# Patient Record
Sex: FEMALE | Race: WHITE | Employment: PART TIME | URBAN - METROPOLITAN AREA
[De-identification: names, ages, dates, MRNs, and addresses within clinical notes are randomized per-mention and may not be internally consistent; named-entity substitution may affect disease eponyms.]

---

## 2020-02-07 ENCOUNTER — OFFICE VISIT (OUTPATIENT)
Dept: FAMILY MEDICINE CLINIC | Age: 25
End: 2020-02-07
Payer: COMMERCIAL

## 2020-02-07 VITALS
HEIGHT: 68 IN | TEMPERATURE: 97.7 F | DIASTOLIC BLOOD PRESSURE: 84 MMHG | RESPIRATION RATE: 16 BRPM | SYSTOLIC BLOOD PRESSURE: 122 MMHG | WEIGHT: 226 LBS | OXYGEN SATURATION: 99 % | BODY MASS INDEX: 34.25 KG/M2 | HEART RATE: 97 BPM

## 2020-02-07 PROCEDURE — G8417 CALC BMI ABV UP PARAM F/U: HCPCS | Performed by: NURSE PRACTITIONER

## 2020-02-07 PROCEDURE — 4004F PT TOBACCO SCREEN RCVD TLK: CPT | Performed by: NURSE PRACTITIONER

## 2020-02-07 PROCEDURE — G8484 FLU IMMUNIZE NO ADMIN: HCPCS | Performed by: NURSE PRACTITIONER

## 2020-02-07 PROCEDURE — 99204 OFFICE O/P NEW MOD 45 MIN: CPT | Performed by: NURSE PRACTITIONER

## 2020-02-07 PROCEDURE — G8427 DOCREV CUR MEDS BY ELIG CLIN: HCPCS | Performed by: NURSE PRACTITIONER

## 2020-02-07 RX ORDER — TIZANIDINE 4 MG/1
4 TABLET ORAL EVERY 8 HOURS PRN
Qty: 30 TABLET | Refills: 0 | Status: SHIPPED | OUTPATIENT
Start: 2020-02-07 | End: 2020-03-06 | Stop reason: SDUPTHER

## 2020-02-07 RX ORDER — BUTALBITAL, ACETAMINOPHEN AND CAFFEINE 50; 325; 40 MG/1; MG/1; MG/1
1 TABLET ORAL EVERY 6 HOURS PRN
Qty: 60 TABLET | Refills: 0 | Status: SHIPPED | OUTPATIENT
Start: 2020-02-07 | End: 2020-07-29

## 2020-02-07 RX ORDER — TOPIRAMATE 50 MG/1
50 TABLET, FILM COATED ORAL NIGHTLY
Qty: 30 TABLET | Refills: 3 | Status: SHIPPED | OUTPATIENT
Start: 2020-02-07 | End: 2020-03-06

## 2020-02-07 RX ORDER — ALBUTEROL SULFATE 90 UG/1
2 AEROSOL, METERED RESPIRATORY (INHALATION) 4 TIMES DAILY PRN
Qty: 1 INHALER | Refills: 5 | Status: SHIPPED | OUTPATIENT
Start: 2020-02-07 | End: 2020-03-06 | Stop reason: SDUPTHER

## 2020-02-07 SDOH — HEALTH STABILITY: MENTAL HEALTH: HOW OFTEN DO YOU HAVE A DRINK CONTAINING ALCOHOL?: NEVER

## 2020-02-07 ASSESSMENT — ENCOUNTER SYMPTOMS
ABDOMINAL DISTENTION: 0
NAUSEA: 1
ABDOMINAL PAIN: 0
EYE PAIN: 0
VOMITING: 0
SHORTNESS OF BREATH: 0
CONSTIPATION: 0
COLOR CHANGE: 0
EYE DISCHARGE: 0
EYE REDNESS: 0
DIARRHEA: 0
PHOTOPHOBIA: 1
BLOOD IN STOOL: 0
EYE ITCHING: 0
WHEEZING: 0
CHEST TIGHTNESS: 0
COUGH: 0
BACK PAIN: 1
ANAL BLEEDING: 0

## 2020-02-07 ASSESSMENT — PATIENT HEALTH QUESTIONNAIRE - PHQ9
2. FEELING DOWN, DEPRESSED OR HOPELESS: 0
SUM OF ALL RESPONSES TO PHQ QUESTIONS 1-9: 0
1. LITTLE INTEREST OR PLEASURE IN DOING THINGS: 0
SUM OF ALL RESPONSES TO PHQ QUESTIONS 1-9: 0
SUM OF ALL RESPONSES TO PHQ9 QUESTIONS 1 & 2: 0

## 2020-02-07 NOTE — PROGRESS NOTES
Subjective:     Patient ID: Katie Olson is a 22 y.o. female who presentstoday for:  Chief Complaint   Patient presents with   Minal Carranza Doctor     Pt. is here to establish care today. Pt. has seen Dr. Mary Hopson in the past. Pt. has a h/o migraines and is taking medication for them but its no longer working.  Anxiety     Pt. is here for increased anxiety. Pt. states she was on medication in the past but can't remember the name.  Shoulder Pain     Pt. states she clentches her shoulders a lot and now has pain inbetween her shoulders.  Health Maintenance     Pt. declines the flu vaccine. Migraine    This is a chronic problem. The current episode started more than 1 year ago. The problem has been gradually worsening. Associated symptoms include back pain, nausea, phonophobia and photophobia. Pertinent negatives include no abdominal pain, coughing, dizziness, ear pain, eye pain, eye redness, fever, neck pain, rhinorrhea, sore throat, vomiting, weakness or weight loss. The symptoms are aggravated by bright light and noise. She has tried NSAIDs, Excedrin and acetaminophen for the symptoms. Her past medical history is significant for migraine headaches. Asthma   There is no chest tightness, cough, difficulty breathing, frequent throat clearing, hemoptysis, hoarse voice, shortness of breath, sputum production or wheezing. This is a chronic problem. The problem occurs intermittently. Associated symptoms include headaches. Pertinent negatives include no appetite change, chest pain, dyspnea on exertion, ear congestion, ear pain, fever, heartburn, malaise/fatigue, myalgias, nasal congestion, orthopnea, PND, postnasal drip, rhinorrhea, sneezing, sore throat, sweats, trouble swallowing or weight loss. Her symptoms are alleviated by beta-agonist and steroid inhaler. She reports moderate improvement on treatment. Risk factors for lung disease include smoking/tobacco exposure.  Her past medical history Normocephalic and atraumatic. Right Ear: Tympanic membrane, ear canal and external ear normal.      Left Ear: Tympanic membrane, ear canal and external ear normal.      Nose: Nose normal. No congestion or rhinorrhea. Mouth/Throat:      Mouth: Mucous membranes are moist.      Pharynx: Oropharynx is clear. No oropharyngeal exudate or posterior oropharyngeal erythema. Eyes:      Extraocular Movements: Extraocular movements intact. Conjunctiva/sclera: Conjunctivae normal.      Pupils: Pupils are equal, round, and reactive to light. Neck:      Musculoskeletal: Normal range of motion and neck supple. No muscular tenderness. Cardiovascular:      Rate and Rhythm: Normal rate and regular rhythm. Heart sounds: Normal heart sounds. Pulmonary:      Effort: Pulmonary effort is normal. No respiratory distress. Breath sounds: Normal breath sounds. No wheezing. Abdominal:      General: Bowel sounds are normal. There is no distension. Palpations: Abdomen is soft. Tenderness: There is no abdominal tenderness. Musculoskeletal: Normal range of motion. General: No swelling. Thoracic back: She exhibits tenderness and spasm. Lymphadenopathy:      Cervical: No cervical adenopathy. Skin:     General: Skin is warm and dry. Neurological:      General: No focal deficit present. Mental Status: She is alert and oriented to person, place, and time. Cranial Nerves: No cranial nerve deficit. Sensory: No sensory deficit. Coordination: Coordination normal.   Psychiatric:         Mood and Affect: Mood normal.         Behavior: Behavior normal.         Assessment & Plan:       Diagnosis Orders   1. Migraine without aura and without status migrainosus, not intractable  topiramate (TOPAMAX) 50 MG tablet    butalbital-acetaminophen-caffeine (FIORICET, ESGIC) -40 MG per tablet   2.  Mild persistent asthma without complication  albuterol sulfate  (90 Base) MCG/ACT inhaler    mometasone (ASMANEX, 30 METERED DOSES,) 220 MCG/INH inhaler   3. Back muscle spasm  tiZANidine (ZANAFLEX) 4 MG tablet   4. Eye exam, routine  [facility] - Gregg Ko, OD, Optometry, [place]     Orders Placed This Encounter   Procedures    [facility] - [address], Stuart Hoffman, Optometry, [place]     Referral Priority:   Routine     Referral Type:   Eval and Treat     Referral Reason:   Specialty Services Required     Referred to Provider:   Abel Perez     Requested Specialty:   Optometry     Number of Visits Requested:   1     Orders Placed This Encounter   Medications    albuterol sulfate  (90 Base) MCG/ACT inhaler     Sig: Inhale 2 puffs into the lungs 4 times daily as needed for Wheezing     Dispense:  1 Inhaler     Refill:  5    mometasone (ASMANEX, 30 METERED DOSES,) 220 MCG/INH inhaler     Sig: Inhale 1 puff into the lungs daily     Dispense:  1 Inhaler     Refill:  5    tiZANidine (ZANAFLEX) 4 MG tablet     Sig: Take 1 tablet by mouth every 8 hours as needed (spasm)     Dispense:  30 tablet     Refill:  0    topiramate (TOPAMAX) 50 MG tablet     Sig: Take 1 tablet by mouth nightly     Dispense:  30 tablet     Refill:  3    butalbital-acetaminophen-caffeine (FIORICET, ESGIC) -40 MG per tablet     Sig: Take 1 tablet by mouth every 6 hours as needed for Migraine     Dispense:  60 tablet     Refill:  0     Medications Discontinued During This Encounter   Medication Reason    albuterol (PROVENTIL HFA) 108 (90 BASE) MCG/ACT inhaler     mometasone (ASMANEX 30 METERED DOSES) 220 MCG/INH inhaler     norethindrone-ethinyl estradiol (NORINYL 1+35, 28,) 1-35 MG-MCG per tablet      Return in about 4 weeks (around 3/6/2020). Reviewed with the patient: currentclinical status, medications, activities and diet.      Side effects, adverse effects of the medicationprescribed today, as well as treatment plan/ rationale and result expectations havebeen discussed with the patient who expresses

## 2020-02-08 ASSESSMENT — ENCOUNTER SYMPTOMS
HOARSE VOICE: 0
TROUBLE SWALLOWING: 0
FREQUENT THROAT CLEARING: 0
CHEST TIGHTNESS: 0
HEMOPTYSIS: 0
SORE THROAT: 0
HEARTBURN: 0
RHINORRHEA: 0
SPUTUM PRODUCTION: 0
DIFFICULTY BREATHING: 0

## 2020-02-13 ENCOUNTER — TELEPHONE (OUTPATIENT)
Dept: FAMILY MEDICINE CLINIC | Age: 25
End: 2020-02-13

## 2020-02-13 NOTE — TELEPHONE ENCOUNTER
Jh Best has been trying to reach Dr LUIS [facility] office but there is never an answer.   Herman Mantilla was trying to reach them as well with no reply The referral to Dr. Jose Alanis

## 2020-02-13 NOTE — TELEPHONE ENCOUNTER
Matthieu Billingsley was able to get the appt sooner with Dr. Leatha Villasenor. Pt will just keep that referral and appt. No new referral is needed.

## 2020-02-13 NOTE — TELEPHONE ENCOUNTER
Pt called back and would like a new referral to [facility] for her vision referral.    Address:   [address] [place] [address], [address] [place] [address]    Phone number:   [phone]    Please put a new referral in for this.     TY

## 2020-02-17 ENCOUNTER — TELEPHONE (OUTPATIENT)
Dept: FAMILY MEDICINE CLINIC | Age: 25
End: 2020-02-17

## 2020-03-06 ENCOUNTER — OFFICE VISIT (OUTPATIENT)
Dept: FAMILY MEDICINE CLINIC | Age: 25
End: 2020-03-06
Payer: COMMERCIAL

## 2020-03-06 VITALS
SYSTOLIC BLOOD PRESSURE: 114 MMHG | TEMPERATURE: 98.3 F | OXYGEN SATURATION: 98 % | WEIGHT: 226 LBS | HEIGHT: 68 IN | RESPIRATION RATE: 16 BRPM | HEART RATE: 87 BPM | DIASTOLIC BLOOD PRESSURE: 80 MMHG | BODY MASS INDEX: 34.25 KG/M2

## 2020-03-06 PROCEDURE — G8417 CALC BMI ABV UP PARAM F/U: HCPCS | Performed by: NURSE PRACTITIONER

## 2020-03-06 PROCEDURE — G8427 DOCREV CUR MEDS BY ELIG CLIN: HCPCS | Performed by: NURSE PRACTITIONER

## 2020-03-06 PROCEDURE — 4004F PT TOBACCO SCREEN RCVD TLK: CPT | Performed by: NURSE PRACTITIONER

## 2020-03-06 PROCEDURE — G8484 FLU IMMUNIZE NO ADMIN: HCPCS | Performed by: NURSE PRACTITIONER

## 2020-03-06 PROCEDURE — 99214 OFFICE O/P EST MOD 30 MIN: CPT | Performed by: NURSE PRACTITIONER

## 2020-03-06 RX ORDER — ALBUTEROL SULFATE 90 UG/1
2 AEROSOL, METERED RESPIRATORY (INHALATION) 4 TIMES DAILY PRN
Qty: 1 INHALER | Refills: 5 | Status: SHIPPED | OUTPATIENT
Start: 2020-03-06 | End: 2021-02-10 | Stop reason: SDUPTHER

## 2020-03-06 RX ORDER — TOPIRAMATE 100 MG/1
100 TABLET, FILM COATED ORAL NIGHTLY
Qty: 30 TABLET | Refills: 3 | Status: SHIPPED | OUTPATIENT
Start: 2020-03-06 | End: 2020-03-27 | Stop reason: SDUPTHER

## 2020-03-06 RX ORDER — TIZANIDINE 4 MG/1
4 TABLET ORAL EVERY 8 HOURS PRN
Qty: 60 TABLET | Refills: 2 | Status: SHIPPED | OUTPATIENT
Start: 2020-03-06 | End: 2020-03-27 | Stop reason: SDUPTHER

## 2020-03-06 RX ORDER — TOPIRAMATE 50 MG/1
50 TABLET, FILM COATED ORAL NIGHTLY
Qty: 30 TABLET | Refills: 3 | Status: CANCELLED | OUTPATIENT
Start: 2020-03-06

## 2020-03-06 RX ORDER — ONDANSETRON 4 MG/1
TABLET, ORALLY DISINTEGRATING ORAL
COMMUNITY
Start: 2020-02-18

## 2020-03-06 RX ORDER — LURASIDONE HYDROCHLORIDE 20 MG/1
TABLET, FILM COATED ORAL
COMMUNITY
Start: 2020-02-29 | End: 2020-07-29

## 2020-03-06 ASSESSMENT — ENCOUNTER SYMPTOMS
DIFFICULTY BREATHING: 0
SPUTUM PRODUCTION: 0
HEMOPTYSIS: 0
RHINORRHEA: 0
EYE REDNESS: 0
ABDOMINAL DISTENTION: 0
ABDOMINAL PAIN: 0
EYE PAIN: 0
NAUSEA: 1
COUGH: 0
HEARTBURN: 0
CONSTIPATION: 0
BLOOD IN STOOL: 0
SORE THROAT: 0
VOMITING: 0
PHOTOPHOBIA: 1
HOARSE VOICE: 0
BACK PAIN: 1
WHEEZING: 0
DIARRHEA: 1
FREQUENT THROAT CLEARING: 0
CHEST TIGHTNESS: 0
SHORTNESS OF BREATH: 0
TROUBLE SWALLOWING: 0
FLATUS: 0
BLOATING: 0

## 2020-03-06 NOTE — PROGRESS NOTES
Subjective:     Patient ID: Ivy Other is a 26 y.o. female who presentstoday for:  Chief Complaint   Patient presents with    Migraine     Pt. is here for a 4 week f/u on migraines. Pt. was prescribed Topamax and Fioricet at her last visit with mild relief. Pt. states the medication she takes at night helps but the one she is suppose to take when a migraine comes on isn't working.  Spasms     Pt. is here for a f/u on muscle spasms. Pt. states the zanaflex is working well.  Diarrhea     Pt. is here with c/o diarrhea since she was in the hospital last month. Migraine    This is a chronic problem. The current episode started more than 1 year ago. The problem has been gradually worsening. The pain is located in the bilateral region. The pain does not radiate. The pain quality is similar to prior headaches. The quality of the pain is described as aching. The pain is moderate. Associated symptoms include back pain, nausea, phonophobia and photophobia. Pertinent negatives include no abdominal pain, coughing, dizziness, ear pain, eye pain, eye redness, fever, neck pain, rhinorrhea, sore throat, vomiting, weakness or weight loss. The symptoms are aggravated by bright light and noise. She has tried NSAIDs, Excedrin and acetaminophen (topamax, fioricet) for the symptoms. The treatment provided moderate relief. Her past medical history is significant for migraine headaches. Diarrhea    This is a new problem. The current episode started 1 to 4 weeks ago. The problem occurs 5 to 10 times per day. The problem has been unchanged. The stool consistency is described as mucous. Associated symptoms include headaches. Pertinent negatives include no abdominal pain, arthralgias, bloating, chills, coughing, fever, increased  flatus, myalgias, sweats, URI, vomiting or weight loss. Nothing aggravates the symptoms. Risk factors include recent antibiotic use. She has tried nothing for the symptoms.        Past Medical

## 2020-03-27 ENCOUNTER — TELEMEDICINE (OUTPATIENT)
Dept: FAMILY MEDICINE CLINIC | Age: 25
End: 2020-03-27
Payer: COMMERCIAL

## 2020-03-27 PROCEDURE — G8427 DOCREV CUR MEDS BY ELIG CLIN: HCPCS | Performed by: NURSE PRACTITIONER

## 2020-03-27 PROCEDURE — 99213 OFFICE O/P EST LOW 20 MIN: CPT | Performed by: NURSE PRACTITIONER

## 2020-03-27 RX ORDER — TOPIRAMATE 100 MG/1
100 TABLET, FILM COATED ORAL 2 TIMES DAILY
Qty: 60 TABLET | Refills: 3 | Status: SHIPPED | OUTPATIENT
Start: 2020-03-27 | End: 2020-08-18

## 2020-03-27 RX ORDER — TIZANIDINE 4 MG/1
4 TABLET ORAL EVERY 8 HOURS PRN
Qty: 60 TABLET | Refills: 2 | Status: SHIPPED | OUTPATIENT
Start: 2020-03-27 | End: 2020-07-08

## 2020-03-27 RX ORDER — SUMATRIPTAN 50 MG/1
50 TABLET, FILM COATED ORAL
Qty: 9 TABLET | Refills: 2 | Status: SHIPPED | OUTPATIENT
Start: 2020-03-27 | End: 2020-07-29

## 2020-03-27 ASSESSMENT — ENCOUNTER SYMPTOMS
ABDOMINAL PAIN: 0
VOMITING: 0
ABDOMINAL DISTENTION: 0
CONSTIPATION: 0
COUGH: 0
BACK PAIN: 1
BLOOD IN STOOL: 0
WHEEZING: 0
NAUSEA: 0
RHINORRHEA: 0
PHOTOPHOBIA: 0
TROUBLE SWALLOWING: 0
SORE THROAT: 0
EYE REDNESS: 0
DIARRHEA: 0
EYE PAIN: 0
SHORTNESS OF BREATH: 0

## 2020-03-27 NOTE — PROGRESS NOTES
Subjective:     Patient ID: Love Boyd is a 26 y.o. female who presentstoday for:  Chief Complaint   Patient presents with    Migraine     3/27/2020    TELEHEALTH EVALUATION -- Audio/Visual (During ZZKBE-13 public health emergency)    Due to [place] 19 outbreak, patient's office visit was converted to a virtual visit. Patient was contacted and agreed to proceed with a virtual visit via Doxy. me  The risks and benefits of converting to a virtual visit were discussed in light of the current infectious disease epidemic. Patient also understood that insurance coverage and co-pays are up to their individual insurance plans. HPI  F/u on migraines reports little improvement since last visit but patient also got new glasses and feels that could be contributing to her symptoms. Pain is similar to previous headaches and mild to moderate with the headaches occur. Fioricet helps take the edge off but does not get ride of them completely. Denies any other concerns today. Past Medical History:   Diagnosis Date    Anxiety     Asthma     Bipolar depression ([facility] [address]     Migraines      Current Outpatient Medications on File Prior to Visit   Medication Sig Dispense Refill    LATUDA 20 MG TABS tablet TAKE 1 TABLET BY MOUTH ONCE DAILY WITH MEALS. (take with at least 350 calories of food)      ondansetron (ZOFRAN-ODT) 4 MG disintegrating tablet DISSOLVE 1 (ONE) TABLET BY MOUTH EVERY 8 HOURS      albuterol sulfate  (90 Base) MCG/ACT inhaler Inhale 2 puffs into the lungs 4 times daily as needed for Wheezing 1 Inhaler 5    mometasone (ASMANEX, 30 METERED DOSES,) 220 MCG/INH inhaler Inhale 1 puff into the lungs daily 1 Inhaler 5    butalbital-acetaminophen-caffeine (FIORICET, ESGIC) -40 MG per tablet Take 1 tablet by mouth every 6 hours as needed for Migraine 60 tablet 0     No current facility-administered medications on file prior to visit. No past surgical history on file.      Family History topiramate (TOPAMAX) 100 MG tablet REORDER    tiZANidine (ZANAFLEX) 4 MG tablet REORDER     Return in about 4 weeks (around 4/24/2020). Reviewed with the patient: currentclinical status, medications, activities and diet. Side effects, adverse effects of the medicationprescribed today, as well as treatment plan/ rationale and result expectations havebeen discussed with the patient who expresses understanding and desires to proceed. Pt instructions reviewed and given to patient.     Close follow up to evaluate treatment resultsand for coordination of care. I have reviewed the patient's medical historyin detail and updated the computerized patient record.     Oliva Bee, APRN - CNP

## 2020-04-03 ENCOUNTER — TELEMEDICINE (OUTPATIENT)
Dept: FAMILY MEDICINE CLINIC | Age: 25
End: 2020-04-03
Payer: COMMERCIAL

## 2020-04-03 PROCEDURE — G8428 CUR MEDS NOT DOCUMENT: HCPCS | Performed by: NURSE PRACTITIONER

## 2020-04-03 PROCEDURE — 99213 OFFICE O/P EST LOW 20 MIN: CPT | Performed by: NURSE PRACTITIONER

## 2020-04-03 RX ORDER — FLUTICASONE PROPIONATE 50 MCG
1 SPRAY, SUSPENSION (ML) NASAL DAILY
Qty: 1 BOTTLE | Refills: 2 | Status: SHIPPED | OUTPATIENT
Start: 2020-04-03 | End: 2020-04-28

## 2020-04-03 RX ORDER — CETIRIZINE HYDROCHLORIDE 10 MG/1
10 TABLET ORAL DAILY
Qty: 90 TABLET | Refills: 2 | Status: SHIPPED | OUTPATIENT
Start: 2020-04-03

## 2020-04-03 ASSESSMENT — ENCOUNTER SYMPTOMS
RHINORRHEA: 0
NAUSEA: 0
VOMITING: 0
PHOTOPHOBIA: 0
WHEEZING: 0
SORE THROAT: 0
EYE PAIN: 0
COUGH: 0
TROUBLE SWALLOWING: 0
BLOOD IN STOOL: 0
CONSTIPATION: 0
DIARRHEA: 0
EYE REDNESS: 0
ABDOMINAL DISTENTION: 0
SHORTNESS OF BREATH: 0
ABDOMINAL PAIN: 0

## 2020-04-03 NOTE — PROGRESS NOTES
Subjective:     Patient ID: Moses Zhang is a 26 y.o. female who presentstoday for:  Chief Complaint   Patient presents with    Migraine     4/3/2020    TELEHEALTH EVALUATION -- Audio/Visual (During XIINC-19 public health emergency)    Due to COVID 19 outbreak, patient's office visit was converted to a virtual visit. Patient was contacted and agreed to proceed with a virtual visit via [employer]ari[employer]. me  The risks and benefits of converting to a virtual visit were discussed in light of the current infectious disease epidemic. Patient also understood that insurance coverage and co-pays are up to their individual insurance plans. Migraine    This is a recurrent problem. The current episode started more than 1 month ago. The problem occurs daily. The problem has been waxing and waning. The pain quality is similar to prior headaches. The quality of the pain is described as aching. The pain is moderate. Pertinent negatives include no abdominal pain, coughing, dizziness, ear pain, eye pain, eye redness, fever, nausea, photophobia, rhinorrhea, sore throat, vomiting or weakness. The symptoms are aggravated by bright light. Treatments tried: topamax, fioricet, imitrex, nsaids, tylenol. The treatment provided mild relief.        Past Medical History:   Diagnosis Date    Anxiety     Asthma     Bipolar depression ([facility] [address]     Migraines      Current Outpatient Medications on File Prior to Visit   Medication Sig Dispense Refill    SUMAtriptan (IMITREX) 50 MG tablet Take 1 tablet by mouth once as needed for Migraine 9 tablet 2    topiramate (TOPAMAX) 100 MG tablet Take 1 tablet by mouth 2 times daily 60 tablet 3    tiZANidine (ZANAFLEX) 4 MG tablet Take 1 tablet by mouth every 8 hours as needed (spasm) 60 tablet 2    LATUDA 20 MG TABS tablet TAKE 1 TABLET BY MOUTH ONCE DAILY WITH MEALS. (take with at least 350 calories of food)      ondansetron (ZOFRAN-ODT) 4 MG disintegrating tablet DISSOLVE 1 (ONE) TABLET BY MOUTH EVERY 8

## 2020-04-09 ENCOUNTER — OFFICE VISIT (OUTPATIENT)
Dept: NEUROLOGY | Age: 25
End: 2020-04-09
Payer: COMMERCIAL

## 2020-04-09 ENCOUNTER — TELEPHONE (OUTPATIENT)
Dept: NEUROLOGY | Age: 25
End: 2020-04-09

## 2020-04-09 VITALS — WEIGHT: 231.4 LBS | BODY MASS INDEX: 35.18 KG/M2 | DIASTOLIC BLOOD PRESSURE: 79 MMHG | SYSTOLIC BLOOD PRESSURE: 136 MMHG

## 2020-04-09 PROBLEM — G43.719 INTRACTABLE CHRONIC MIGRAINE WITHOUT AURA AND WITHOUT STATUS MIGRAINOSUS: Status: ACTIVE | Noted: 2020-04-09

## 2020-04-09 PROCEDURE — 4004F PT TOBACCO SCREEN RCVD TLK: CPT | Performed by: PSYCHIATRY & NEUROLOGY

## 2020-04-09 PROCEDURE — 99205 OFFICE O/P NEW HI 60 MIN: CPT | Performed by: PSYCHIATRY & NEUROLOGY

## 2020-04-09 PROCEDURE — G8427 DOCREV CUR MEDS BY ELIG CLIN: HCPCS | Performed by: PSYCHIATRY & NEUROLOGY

## 2020-04-09 PROCEDURE — G8417 CALC BMI ABV UP PARAM F/U: HCPCS | Performed by: PSYCHIATRY & NEUROLOGY

## 2020-04-09 RX ORDER — ELETRIPTAN HYDROBROMIDE 40 MG/1
40 TABLET, FILM COATED ORAL
Qty: 6 TABLET | Refills: 3 | Status: SHIPPED | OUTPATIENT
Start: 2020-04-09 | End: 2020-07-29

## 2020-04-09 RX ORDER — ERENUMAB-AOOE 140 MG/ML
1 INJECTION, SOLUTION SUBCUTANEOUS
Qty: 1 ML | Refills: 6 | Status: SHIPPED | OUTPATIENT
Start: 2020-04-09 | End: 2020-05-09

## 2020-04-09 ASSESSMENT — ENCOUNTER SYMPTOMS
PHOTOPHOBIA: 0
VOMITING: 0
BACK PAIN: 0
TROUBLE SWALLOWING: 0
SHORTNESS OF BREATH: 0
NAUSEA: 0
CHOKING: 0

## 2020-04-09 NOTE — PROGRESS NOTES
Subjective:      Patient ID: Ary Sloan is a 26 y.o. female who presents today for:  Chief Complaint   Patient presents with    New Patient     patient states that she is having migraines on and off all day long. She states that when she takes her medication that it works for about 2 hours and then they come back even stronger them before. She gets nausea, vomiting, light sensitive,noise sensitive, and also sees aura. States that there are times she just has to lay down and try to go to sleep. The three medications that she has been tried on seem to take the edge off but do not take them away. HPI 71-year-old right-handed female who is here for evaluation of migraine headaches. Patient is migraine headaches since she was young around 9years of age. She use  to have about 2 migraine headache days a week then they became 3-4  migraine headache  days a week and now almost becoming chronic daily headaches. Her headaches and want more than 15 headache days a month. She has flashing lights kaleidoscope's nausea and many times she has vomiting. Patient initially was tried on beta-blockers though she has asthma and not a candidate for beta-blockers. She is now on Topamax 150 mg which is not helping. She is on sumatriptan which very rarely helps and she is on butalbital which does not help she tried ibuprofen as well. Patient does not report any other types of headaches. She does not have history of sleep apnea. There is remote history of migraine headaches in the family. Her eyes were checked just about 2 months ago there is no session of papilledema. Patient has no rash joint swelling or any other systemic illnesses. She does have bipolar disorder. She is on [place]. Of note she has been tried on butalbital and is likely to cause rebound headaches.     Her other symptoms are those of epistaxis and she has pain in the frontal nasal area when she has this and she is found many times with blood sores/hives    Vicodin [Hydrocodone-Acetaminophen] Nausea And Vomiting         Review of Systems   Constitutional: Negative for fever. HENT: Negative for ear pain, tinnitus and trouble swallowing. Eyes: Negative for photophobia and visual disturbance. Respiratory: Negative for choking and shortness of breath. Cardiovascular: Negative for chest pain and palpitations. Gastrointestinal: Negative for nausea and vomiting. Musculoskeletal: Negative for back pain, gait problem, joint swelling, myalgias, neck pain and neck stiffness. Neurological: Negative for dizziness, tremors, seizures, syncope, facial asymmetry, speech difficulty, weakness, light-headedness, numbness and headaches. Psychiatric/Behavioral: Negative for behavioral problems, confusion, hallucinations and sleep disturbance. Objective:   /79 (Site: Left Upper Arm, Position: Sitting, Cuff Size: Medium Adult)   Wt 231 lb 6.4 oz (105 kg)   BMI 35.18 kg/m²     Physical Exam  Vitals signs reviewed. Eyes:      Pupils: Pupils are equal, round, and reactive to light. Neck:      Musculoskeletal: Normal range of motion. Cardiovascular:      Rate and Rhythm: Normal rate and regular rhythm. Heart sounds: No murmur. Pulmonary:      Effort: Pulmonary effort is normal.      Breath sounds: Normal breath sounds. Musculoskeletal: Normal range of motion. Neurological:      Mental Status: She is alert and oriented to person, place, and time. Cranial Nerves: No cranial nerve deficit. Sensory: No sensory deficit. Motor: No abnormal muscle tone. Coordination: Coordination normal.      Deep Tendon Reflexes: Reflexes are normal and symmetric. Babinski sign absent on the right side. Babinski sign absent on the left side. No results found.     Lab Results   Component Value Date    WBC 12.6 10/28/2015    RBC 5.89 10/28/2015    HGB 15.2 10/28/2015    HCT 46.9 10/28/2015    MCV 79.6 10/28/2015    MCH 25.8 10/28/2015    MCHC 32.4 10/28/2015    RDW 15.7 10/28/2015     10/28/2015    MPV 8.6 03/14/2014     Lab Results   Component Value Date     10/28/2015    K 4.1 10/28/2015    CL 97 10/28/2015    CO2 32 10/28/2015    BUN 10 10/28/2015    CREATININE 0.79 10/28/2015    GFRAA >60.0 10/28/2015    LABGLOM >60.0 10/28/2015    GLUCOSE 68 10/28/2015    PROT 7.5 10/28/2015    LABALBU 4.7 10/28/2015    CALCIUM 10.4 10/28/2015    BILITOT 0.3 10/28/2015    ALKPHOS 102 10/28/2015    AST 17 10/28/2015    ALT 21 10/28/2015     No results found for: PROTIME, INR  No results found for: TSH, WRYNCPTR94, FOLATE, FERRITIN, IRON, TIBC, PTRFSAT, TSH, FREET4  No results found for: TRIG, HDL, LDLCALC, LDLDIRECT, LABVLDL  No results found for: LABAMPH, BARBSCNU, LABBENZ, CANNAB, COCAINESCRN, LABMETH, OPIATESCREENURINE, PHENCYCLIDINESCREENURINE, PPXUR, ETOH  No results found for: LITHIUM, DILFRTOT, VALPROATE    Assessment:       Diagnosis Orders   1. Intractable chronic migraine without aura and without status migrainosus  MRI Brain W WO Contrast   Intractable migraine headaches now amounting to more than 15 headache days a month becoming now chronic migraine headaches. The ED initially started with episodic migraines and have now turned into chronic migraines. This is not uncommon in untreated migraines. Patient has been tried on Topamax and was on some blood pressure medication in the past though she has asthma and not a candidate for beta-blockers. Patient was tried on sumatriptan which does not help. She has been on large dose of ibuprofen which does not help. In this situation the only option would be CGRP blocker Aimovig once a month for prevention. Of note patient is on butalbital which causes rebound headaches and has not helped her headaches. I have therefore recommended that she curtail the use of butalbital unless really needed.     We will retry her on a different triptan such as Relpax though again changing from

## 2020-04-28 ENCOUNTER — VIRTUAL VISIT (OUTPATIENT)
Dept: FAMILY MEDICINE CLINIC | Age: 25
End: 2020-04-28
Payer: COMMERCIAL

## 2020-04-28 PROCEDURE — G8427 DOCREV CUR MEDS BY ELIG CLIN: HCPCS | Performed by: NURSE PRACTITIONER

## 2020-04-28 PROCEDURE — 99213 OFFICE O/P EST LOW 20 MIN: CPT | Performed by: NURSE PRACTITIONER

## 2020-04-28 RX ORDER — CETIRIZINE HYDROCHLORIDE, PSEUDOEPHEDRINE HYDROCHLORIDE 5; 120 MG/1; MG/1
1 TABLET, FILM COATED, EXTENDED RELEASE ORAL 2 TIMES DAILY
Qty: 180 TABLET | Refills: 0 | Status: SHIPPED | OUTPATIENT
Start: 2020-04-28 | End: 2021-04-06

## 2020-04-28 NOTE — PROGRESS NOTES
syncope, weakness and light-headedness. Psychiatric/Behavioral: Negative for sleep disturbance. Objective: There were no vitals taken for this visit. Physical Exam  Constitutional:       Appearance: Normal appearance. She is not toxic-appearing. Eyes:      General: Gaze aligned appropriately. Conjunctiva/sclera: Conjunctivae normal.   Neck:      Musculoskeletal: Neck supple. Pulmonary:      Effort: Pulmonary effort is normal. No respiratory distress. Musculoskeletal: Normal range of motion. Neurological:      Mental Status: She is alert and oriented to person, place, and time. Psychiatric:         Mood and Affect: Mood normal.         Speech: Speech normal.         Behavior: Behavior normal.         Assessment & Plan:       Diagnosis Orders   1. Intractable chronic migraine without aura and without status migrainosus       No orders of the defined types were placed in this encounter. Orders Placed This Encounter   Medications    cetirizine-psuedoephedrine (ZYRTEC-D) 5-120 MG per extended release tablet     Sig: Take 1 tablet by mouth 2 times daily     Dispense:  180 tablet     Refill:  0     Medications Discontinued During This Encounter   Medication Reason    fluticasone (FLONASE) 50 MCG/ACT nasal spray LIST CLEANUP     Return in about 3 months (around 7/28/2020). Reviewed with the patient: currentclinical status, medications, activities and diet. Side effects, adverse effects of the medicationprescribed today, as well as treatment plan/ rationale and result expectations havebeen discussed with the patient who expresses understanding and desires to proceed. Pt instructions reviewed and given to patient.     Close follow up to evaluate treatment resultsand for coordination of care. I have reviewed the patient's medical historyin detail and updated the computerized patient record.     Trina Sullivan, KENNETH - CNP

## 2020-04-29 ENCOUNTER — TELEPHONE (OUTPATIENT)
Dept: FAMILY MEDICINE CLINIC | Age: 25
End: 2020-04-29

## 2020-04-29 ASSESSMENT — ENCOUNTER SYMPTOMS
TROUBLE SWALLOWING: 0
RHINORRHEA: 0
COUGH: 0
ABDOMINAL DISTENTION: 0
EYE PAIN: 0
VOMITING: 0
DIARRHEA: 0
CONSTIPATION: 0
NAUSEA: 0
BLOOD IN STOOL: 0
PHOTOPHOBIA: 0
WHEEZING: 0
EYE REDNESS: 0
SHORTNESS OF BREATH: 0
ABDOMINAL PAIN: 0
SORE THROAT: 0

## 2020-05-19 ENCOUNTER — HOSPITAL ENCOUNTER (OUTPATIENT)
Dept: MRI IMAGING | Age: 25
Discharge: HOME | End: 2020-05-21
Payer: COMMERCIAL

## 2020-05-19 PROCEDURE — 70553 MRI BRAIN STEM W/O & W/DYE: CPT

## 2020-05-19 PROCEDURE — A9579 GAD-BASE MR CONTRAST NOS,1ML: HCPCS | Performed by: PSYCHIATRY & NEUROLOGY

## 2020-05-19 PROCEDURE — 6360000004 HC RX CONTRAST MEDICATION: Performed by: PSYCHIATRY & NEUROLOGY

## 2020-05-19 RX ORDER — SODIUM CHLORIDE 9 MG/ML
10 INJECTION INTRAVENOUS ONCE
Status: DISCONTINUED | OUTPATIENT
Start: 2020-05-19 | End: 2020-05-22 | Stop reason: HOSPADM

## 2020-05-19 RX ADMIN — GADOTERIDOL 20 ML: 279.3 INJECTION, SOLUTION INTRAVENOUS at 12:00

## 2020-05-27 ENCOUNTER — TELEPHONE (OUTPATIENT)
Dept: NEUROLOGY | Age: 25
End: 2020-05-27

## 2020-05-28 ENCOUNTER — TELEPHONE (OUTPATIENT)
Dept: ADMINISTRATIVE | Age: 25
End: 2020-05-28

## 2020-05-28 ENCOUNTER — NURSE TRIAGE (OUTPATIENT)
Dept: OTHER | Facility: CLINIC | Age: 25
End: 2020-05-28

## 2020-05-28 NOTE — TELEPHONE ENCOUNTER
The Pt called to schedule an appt with Mikki Tejeda concerning possible Diabetes Issues. She stated she had \"Other\" medical issues going on as well. I transferred her phone call to Geri at the  [address] for F/Up assistance.

## 2020-05-28 NOTE — TELEPHONE ENCOUNTER
Reason for Disposition   Dizziness not present now, but is a chronic symptom (recurrent or ongoing AND lasting > 4 weeks)    Answer Assessment - Initial Assessment Questions  1. DESCRIPTION: \"Describe your dizziness. \"      Feels like \"I am drunk\"  2. LIGHTHEADED: \"Do you feel lightheaded? \" (e.g., somewhat faint, woozy, weak upon standing)      Feels like passing out   3. VERTIGO: \"Do you feel like either you or the room is spinning or tilting? \" (i.e. vertigo)      Denies   4. SEVERITY: \"How bad is it? \"  \"Do you feel like you are going to faint? \" \"Can you stand and walk? \"    - MILD - walking normally    - MODERATE - interferes with normal activities (e.g., work, school)     - SEVERE - unable to stand, requires support to walk, feels like passing out now. Mild   5. ONSET:  \"When did the dizziness begin? \"      A month ago   6. AGGRAVATING FACTORS: \"Does anything make it worse? \" (e.g., standing, change in head position)      Eating sugar   7. HEART RATE: \"Can you tell me your heart rate? \" \"How many beats in 15 seconds? \"  (Note: not all patients can do this)        91 currently on apple watch   8. CAUSE: \"What do you think is causing the dizziness? \"      Possible diabetes   9. RECURRENT SYMPTOM: \"Have you had dizziness before? \" If so, ask: \"When was the last time? \" \"What happened that time? \"      For the past month   10. OTHER SYMPTOMS: \"Do you have any other symptoms? \" (e.g., fever, chest pain, vomiting, diarrhea, bleeding)        Drinking a lot and urinating more   11. PREGNANCY: \"Is there any chance you are pregnant? \" \"When was your last menstrual period? \"        Denies   LMP-on it now    Protocols used: DIZZINESS-ADULT-OH  Received call from [address]. Patient triaged using appropriate protocol. Care advice given per protocol. Patient/Caregiver verbalized understanding of care advice and disposition. Call soft transferred to [address] to schedule appointment.     Please do not

## 2020-05-28 NOTE — TELEPHONE ENCOUNTER
MYCHART   [place] CARRILLO Sweeney 89 See within next couple of weeks. Pt wants to discuss possible diabetes. psc/garyw  Pt scheduled for 5/29/2020.

## 2020-05-29 ENCOUNTER — TELEMEDICINE (OUTPATIENT)
Dept: FAMILY MEDICINE CLINIC | Age: 25
End: 2020-05-29

## 2020-05-29 PROCEDURE — 99213 OFFICE O/P EST LOW 20 MIN: CPT | Performed by: NURSE PRACTITIONER

## 2020-05-29 PROCEDURE — G8427 DOCREV CUR MEDS BY ELIG CLIN: HCPCS | Performed by: NURSE PRACTITIONER

## 2020-05-29 NOTE — PROGRESS NOTES
Negative for cold intolerance, heat intolerance, polyphagia and polyuria. Genitourinary: Negative. Musculoskeletal: Negative for arthralgias, joint swelling, myalgias and neck pain. Skin: Negative for color change, rash and wound. Neurological: Positive for dizziness (reports dizziness when she does not eat). Negative for vertigo, syncope, weakness, light-headedness, numbness and headaches. Psychiatric/Behavioral: Negative. Objective: There were no vitals taken for this visit. Physical Exam  Constitutional:       Appearance: Normal appearance. She is not toxic-appearing. HENT:      Mouth/Throat:      Mouth: Mucous membranes are moist.   Eyes:      Conjunctiva/sclera: Conjunctivae normal.   Neck:      Musculoskeletal: Neck supple. Pulmonary:      Effort: No respiratory distress. Neurological:      General: No focal deficit present. Mental Status: She is alert and oriented to person, place, and time. Psychiatric:         Mood and Affect: Mood normal.         Speech: Speech normal.         Behavior: Behavior normal.         Assessment & Plan:       Diagnosis Orders   1. Fatigue, unspecified type  CBC Auto Differential    Comprehensive Metabolic Panel    TSH Without Reflex    Hemoglobin A1C    Vitamin D 25 Hydroxy    Vitamin B12 & Folate   2.  Polydipsia  Comprehensive Metabolic Panel    Hemoglobin A1C     Orders Placed This Encounter   Procedures    CBC Auto Differential     Standing Status:   Future     Standing Expiration Date:   5/29/2021    Comprehensive Metabolic Panel     Standing Status:   Future     Standing Expiration Date:   5/29/2021    TSH Without Reflex     Standing Status:   Future     Standing Expiration Date:   5/29/2021    Hemoglobin A1C     Standing Status:   Future     Standing Expiration Date:   5/29/2021    Vitamin D 25 Hydroxy     Standing Status:   Future     Standing Expiration Date:   5/29/2021    Vitamin B12 & Folate     Standing Status:   Future

## 2020-06-01 DIAGNOSIS — R63.1 POLYDIPSIA: ICD-10-CM

## 2020-06-01 DIAGNOSIS — R53.83 FATIGUE, UNSPECIFIED TYPE: ICD-10-CM

## 2020-06-01 LAB
ALBUMIN SERPL-MCNC: 4.1 G/DL (ref 3.5–4.6)
ALP BLD-CCNC: 80 U/L (ref 40–130)
ALT SERPL-CCNC: 23 U/L (ref 0–33)
ANION GAP SERPL CALCULATED.3IONS-SCNC: 11 MEQ/L (ref 9–15)
AST SERPL-CCNC: 20 U/L (ref 0–35)
BASOPHILS ABSOLUTE: 0 K/UL (ref 0–0.2)
BASOPHILS RELATIVE PERCENT: 0.5 %
BILIRUB SERPL-MCNC: <0.2 MG/DL (ref 0.2–0.7)
BUN BLDV-MCNC: 13 MG/DL (ref 6–20)
CALCIUM SERPL-MCNC: 9.3 MG/DL (ref 8.5–9.9)
CHLORIDE BLD-SCNC: 102 MEQ/L (ref 95–107)
CO2: 23 MEQ/L (ref 20–31)
CREAT SERPL-MCNC: 0.84 MG/DL (ref 0.5–0.9)
EOSINOPHILS ABSOLUTE: 0.1 K/UL (ref 0–0.7)
EOSINOPHILS RELATIVE PERCENT: 1.8 %
FOLATE: 3.6 NG/ML (ref 7.3–26.1)
GFR AFRICAN AMERICAN: >60
GFR NON-AFRICAN AMERICAN: >60
GLOBULIN: 3.3 G/DL (ref 2.3–3.5)
GLUCOSE BLD-MCNC: 87 MG/DL (ref 70–99)
HBA1C MFR BLD: 5.8 % (ref 4.8–5.9)
HCT VFR BLD CALC: 40 % (ref 37–47)
HEMOGLOBIN: 13.3 G/DL (ref 12–16)
LYMPHOCYTES ABSOLUTE: 1.9 K/UL (ref 1–4.8)
LYMPHOCYTES RELATIVE PERCENT: 29.3 %
MCH RBC QN AUTO: 26.7 PG (ref 27–31.3)
MCHC RBC AUTO-ENTMCNC: 33.3 % (ref 33–37)
MCV RBC AUTO: 80.2 FL (ref 82–100)
MONOCYTES ABSOLUTE: 0.3 K/UL (ref 0.2–0.8)
MONOCYTES RELATIVE PERCENT: 4.8 %
NEUTROPHILS ABSOLUTE: 4.2 K/UL (ref 1.4–6.5)
NEUTROPHILS RELATIVE PERCENT: 63.6 %
PDW BLD-RTO: 15.6 % (ref 11.5–14.5)
PLATELET # BLD: 322 K/UL (ref 130–400)
POTASSIUM SERPL-SCNC: 4.6 MEQ/L (ref 3.4–4.9)
RBC # BLD: 4.99 M/UL (ref 4.2–5.4)
SODIUM BLD-SCNC: 136 MEQ/L (ref 135–144)
TOTAL PROTEIN: 7.4 G/DL (ref 6.3–8)
TSH SERPL DL<=0.05 MIU/L-ACNC: 1.36 UIU/ML (ref 0.44–3.86)
VITAMIN B-12: 418 PG/ML (ref 232–1245)
VITAMIN D 25-HYDROXY: 16.8 NG/ML (ref 30–100)
WBC # BLD: 6.7 K/UL (ref 4.8–10.8)

## 2020-06-01 ASSESSMENT — ENCOUNTER SYMPTOMS
ABDOMINAL PAIN: 0
WHEEZING: 0
NAUSEA: 0
VISUAL CHANGE: 0
DIARRHEA: 0
COUGH: 0
SWOLLEN GLANDS: 0
VOMITING: 0
SHORTNESS OF BREATH: 0
CHANGE IN BOWEL HABIT: 0
COLOR CHANGE: 0
SORE THROAT: 0
CHEST TIGHTNESS: 0

## 2020-06-02 RX ORDER — FERROUS SULFATE 325(65) MG
325 TABLET ORAL
Qty: 90 TABLET | Refills: 1 | Status: SHIPPED | OUTPATIENT
Start: 2020-06-02 | End: 2020-10-20

## 2020-06-02 RX ORDER — ERGOCALCIFEROL 1.25 MG/1
50000 CAPSULE ORAL WEEKLY
Qty: 12 CAPSULE | Refills: 1 | Status: SHIPPED | OUTPATIENT
Start: 2020-06-02 | End: 2020-10-20

## 2020-06-13 ENCOUNTER — HOSPITAL ENCOUNTER (OUTPATIENT)
Dept: CT IMAGING | Age: 25
Discharge: HOME | End: 2020-06-15
Payer: COMMERCIAL

## 2020-06-13 VITALS — DIASTOLIC BLOOD PRESSURE: 78 MMHG | SYSTOLIC BLOOD PRESSURE: 114 MMHG | HEART RATE: 72 BPM | RESPIRATION RATE: 18 BRPM

## 2020-06-13 PROCEDURE — 6360000004 HC RX CONTRAST MEDICATION: Performed by: PSYCHIATRY & NEUROLOGY

## 2020-06-13 PROCEDURE — 70496 CT ANGIOGRAPHY HEAD: CPT

## 2020-06-13 RX ORDER — SODIUM CHLORIDE 0.9 % (FLUSH) 0.9 %
10 SYRINGE (ML) INJECTION
Status: DISPENSED | OUTPATIENT
Start: 2020-06-13 | End: 2020-06-13

## 2020-06-13 RX ADMIN — IOPAMIDOL 100 ML: 612 INJECTION, SOLUTION INTRAVENOUS at 11:24

## 2020-06-13 ASSESSMENT — PAIN DESCRIPTION - PAIN TYPE: TYPE: CHRONIC PAIN

## 2020-06-13 ASSESSMENT — PAIN DESCRIPTION - LOCATION: LOCATION: HEAD

## 2020-06-13 ASSESSMENT — PAIN SCALES - GENERAL: PAINLEVEL_OUTOF10: 5

## 2020-06-25 ENCOUNTER — TELEPHONE (OUTPATIENT)
Dept: NEUROLOGY | Age: 25
End: 2020-06-25

## 2020-07-08 RX ORDER — TIZANIDINE 4 MG/1
4 TABLET ORAL EVERY 8 HOURS PRN
Qty: 60 TABLET | Refills: 2 | Status: SHIPPED | OUTPATIENT
Start: 2020-07-08 | End: 2020-09-18

## 2020-07-09 ENCOUNTER — OFFICE VISIT (OUTPATIENT)
Dept: NEUROLOGY | Age: 25
End: 2020-07-09
Payer: COMMERCIAL

## 2020-07-09 VITALS
DIASTOLIC BLOOD PRESSURE: 81 MMHG | SYSTOLIC BLOOD PRESSURE: 136 MMHG | BODY MASS INDEX: 36.34 KG/M2 | HEART RATE: 99 BPM | WEIGHT: 239 LBS

## 2020-07-09 PROCEDURE — G8427 DOCREV CUR MEDS BY ELIG CLIN: HCPCS | Performed by: PSYCHIATRY & NEUROLOGY

## 2020-07-09 PROCEDURE — 4004F PT TOBACCO SCREEN RCVD TLK: CPT | Performed by: PSYCHIATRY & NEUROLOGY

## 2020-07-09 PROCEDURE — 99214 OFFICE O/P EST MOD 30 MIN: CPT | Performed by: PSYCHIATRY & NEUROLOGY

## 2020-07-09 PROCEDURE — G8417 CALC BMI ABV UP PARAM F/U: HCPCS | Performed by: PSYCHIATRY & NEUROLOGY

## 2020-07-09 RX ORDER — ERENUMAB-AOOE 140 MG/ML
INJECTION, SOLUTION SUBCUTANEOUS
COMMUNITY
Start: 2020-05-26

## 2020-07-09 RX ORDER — NARATRIPTAN 2.5 MG/1
2.5 TABLET ORAL
Qty: 9 TABLET | Refills: 3 | Status: SHIPPED | OUTPATIENT
Start: 2020-07-09 | End: 2020-10-19

## 2020-07-09 ASSESSMENT — ENCOUNTER SYMPTOMS
CHOKING: 0
PHOTOPHOBIA: 0
TROUBLE SWALLOWING: 0
COLOR CHANGE: 0
NAUSEA: 0
BACK PAIN: 0
VOMITING: 0
SHORTNESS OF BREATH: 0

## 2020-07-09 NOTE — PROGRESS NOTES
file     Minutes per session: Not on file    Stress: Not on file   Relationships    Social connections     Talks on phone: Not on file     Gets together: Not on file     Attends Yarsani service: Not on file     Active member of club or organization: Not on file     Attends meetings of clubs or organizations: Not on file     Relationship status: Not on file    Intimate partner violence     Fear of current or ex partner: Not on file     Emotionally abused: Not on file     Physically abused: Not on file     Forced sexual activity: Not on file   Other Topics Concern    Not on file   Social History Narrative    Not on file     Family History   Problem Relation Age of Onset    Mental Illness Mother     Anxiety Disorder Mother     Schizophrenia Mother     Cancer Father         throat cancer    Allergies Brother      Allergies   Allergen Reactions    Aspirin Hives     Oral sores/hives    Vicodin [Hydrocodone-Acetaminophen] Nausea And Vomiting       Current Outpatient Medications   Medication Sig Dispense Refill    naratriptan (AMERGE) 2.5 MG tablet Take 1 tablet by mouth once as needed for Migraine 2.5 mg at onset of headache, may repeat in 4 hours if needed 9 tablet 3    tiZANidine (ZANAFLEX) 4 MG tablet Take 1 tablet by mouth every 8 hours as needed (spasm) 60 tablet 2    vitamin D (ERGOCALCIFEROL) 1.25 MG (21068 UT) CAPS capsule Take 1 capsule by mouth once a week 12 capsule 1    ferrous sulfate (IRON 325) 325 (65 Fe) MG tablet Take 1 tablet by mouth daily (with breakfast) 90 tablet 1    cetirizine (ZYRTEC) 10 MG tablet Take 1 tablet by mouth daily 90 tablet 2    topiramate (TOPAMAX) 100 MG tablet Take 1 tablet by mouth 2 times daily 60 tablet 3    LATUDA 20 MG TABS tablet TAKE 1 TABLET BY MOUTH ONCE DAILY WITH MEALS. (take with at least 350 calories of food)      albuterol sulfate  (90 Base) MCG/ACT inhaler Inhale 2 puffs into the lungs 4 times daily as needed for Wheezing 1 Inhaler 5    mometasone (ASMANEX, 30 METERED DOSES,) 220 MCG/INH inhaler Inhale 1 puff into the lungs daily 1 Inhaler 5    AIMOVIG 140 MG/ML SOAJ Inject 1 mL into the skin every 30 days      eletriptan (RELPAX) 40 MG tablet Take 1 tablet by mouth once as needed (headache) may repeat in 2 hours if necessary (Patient not taking: Reported on 7/9/2020) 6 tablet 3    SUMAtriptan (IMITREX) 50 MG tablet Take 1 tablet by mouth once as needed for Migraine (Patient not taking: Reported on 7/9/2020) 9 tablet 2    ondansetron (ZOFRAN-ODT) 4 MG disintegrating tablet DISSOLVE 1 (ONE) TABLET BY MOUTH EVERY 8 HOURS      butalbital-acetaminophen-caffeine (FIORICET, ESGIC) -40 MG per tablet Take 1 tablet by mouth every 6 hours as needed for Migraine (Patient not taking: Reported on 7/9/2020) 60 tablet 0     No current facility-administered medications for this visit. Review of Systems   Constitutional: Negative for fever. HENT: Negative for ear pain, tinnitus and trouble swallowing. Eyes: Negative for photophobia and visual disturbance. Respiratory: Negative for choking and shortness of breath. Cardiovascular: Negative for chest pain and palpitations. Gastrointestinal: Negative for nausea and vomiting. Musculoskeletal: Negative for back pain, gait problem, joint swelling, myalgias, neck pain and neck stiffness. Skin: Negative for color change. Allergic/Immunologic: Negative for food allergies. Neurological: Negative for dizziness, tremors, seizures, syncope, facial asymmetry, speech difficulty, weakness, light-headedness, numbness and headaches. Psychiatric/Behavioral: Negative for behavioral problems, confusion, hallucinations and sleep disturbance. Objective:   /81 (Site: Right Upper Arm, Position: Sitting, Cuff Size: Medium Adult)   Pulse 99   Wt 239 lb (108.4 kg)   BMI 36.34 kg/m²     Physical Exam  Vitals signs reviewed.    Eyes:      Pupils: Pupils are equal, round, and reactive to light.   Neck:      Musculoskeletal: Normal range of motion. Cardiovascular:      Rate and Rhythm: Normal rate and regular rhythm. Heart sounds: No murmur. Pulmonary:      Effort: Pulmonary effort is normal.      Breath sounds: Normal breath sounds. Abdominal:      General: Bowel sounds are normal.   Musculoskeletal: Normal range of motion. Skin:     General: Skin is warm. Neurological:      Mental Status: She is alert and oriented to person, place, and time. Cranial Nerves: No cranial nerve deficit. Sensory: No sensory deficit. Motor: No abnormal muscle tone. Coordination: Coordination normal.      Deep Tendon Reflexes: Reflexes are normal and symmetric. Babinski sign absent on the right side. Babinski sign absent on the left side. Psychiatric:         Mood and Affect: Mood normal.         Cta Head W Wo Contrast    Result Date: 6/15/2020  EXAMINATION: CTA HEAD W WO CONTRAST DATE AND TIME:6/13/2020 10:21 AM CLINICAL HISTORY: Malformation Z87.74 Personal history of arterial venous malformation (AVM) ICD10 COMPARISON: MRI 9/64/8232 TECHNIQUE:Helical CTA of the head was performed from the vertex to the foramen magnum following the uneventful intravenous administration of 100 cc of nonionic contrast without incident. 2-D images were reconstructed in the sagittal and coronal planes. Three Dimensional Maximum Intensity Projection (3D-MIP) images were created. All images were reviewed and primarily archived to PACS workstation. All CT scans at this facility use dose modulation, iterative reconstruction, and/or weight based dosing when appropriate to reduce radiation dose to as low as reasonably achievable. FINDINGS: There is a tuft of linear vessels continuous with the larger collateral vein in the inferior right cerebellum. No associated mass effect or edema. 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 This is concordant with MRI findings.      Intracranial ICAs: Flow is visualized within the precavernous, cavernous, clinoid and supraclinoid segments of the internal carotid arteries bilaterally        Anterior cerebral artery: The bilaterals  A1 and A2 segments are patent. Middle cerebral artery: Bilateral horizontal, insular, opercular, and cortical segments of the right and left middle cerebral cerebral arteries are patent. Posterior Circulation: Bilateral posterior cerebral arteries are patent. Basilar artery:Flow is visualized in the basilar artery and the distal portions of the vertebral arteries. Aneurysm No aneurysm or dissection in the anterior or posterior circulations. .     Neurocranium The visualized neurocranium is intact. Dural sinus: As visualized the opacified dural venous sinuses are unremarkable. SMALL RIGHT CEREBELLAR DVA.  MAJOR INTRACRANIAL ARTERIAL STRUCTURES ARE PATENT, WITHOUT HIGH GRADE STENOSIS, ABNORMAL LARGE VESSEL CUTOFF, OR ANEURYSM,       Lab Results   Component Value Date    WBC 10.7 06/24/2020    WBC 6.7 06/01/2020    RBC 5.01 06/24/2020    HGB 13.3 06/24/2020    HCT 40.3 06/24/2020    MCV 80 06/24/2020    MCH 26.7 06/01/2020    MCHC 33.0 06/24/2020    RDW 15.6 06/01/2020     06/24/2020    MPV 8.6 03/14/2014     Lab Results   Component Value Date     06/24/2020    K 3.2 06/24/2020     06/24/2020    CO2 23 06/01/2020    BUN 13 06/01/2020    CREATININE 0.91 06/24/2020    GFRAA >60 06/24/2020    LABGLOM >60 06/24/2020    GLUCOSE 112 06/24/2020    PROT 7.2 06/24/2020    LABALBU 4.0 06/24/2020    CALCIUM 9.5 06/24/2020    BILITOT 0.2 06/24/2020    ALKPHOS 74 06/24/2020    AST 17 06/24/2020    ALT 24 06/24/2020     Lab Results   Component Value Date    PROTIME 12.0 06/24/2020    INR 1.0 06/24/2020     Lab Results   Component Value Date    TSH 2.10 06/24/2020    [id number] 06/01/2020    FOLATE 3.6 06/01/2020     No results found for: TRIG, HDL, LDLCALC, LDLDIRECT, LABVLDL  Lab Results   Component Value

## 2020-07-29 ENCOUNTER — VIRTUAL VISIT (OUTPATIENT)
Dept: FAMILY MEDICINE CLINIC | Age: 25
End: 2020-07-29
Payer: COMMERCIAL

## 2020-07-29 PROBLEM — E66.01 MORBIDLY OBESE (HCC): Status: ACTIVE | Noted: 2020-07-29

## 2020-07-29 PROCEDURE — G8427 DOCREV CUR MEDS BY ELIG CLIN: HCPCS | Performed by: NURSE PRACTITIONER

## 2020-07-29 PROCEDURE — 99213 OFFICE O/P EST LOW 20 MIN: CPT | Performed by: NURSE PRACTITIONER

## 2020-07-29 RX ORDER — LURASIDONE HYDROCHLORIDE 60 MG/1
TABLET, FILM COATED ORAL
COMMUNITY
Start: 2020-07-15

## 2020-07-29 ASSESSMENT — ENCOUNTER SYMPTOMS
COUGH: 0
SHORTNESS OF BREATH: 0
WHEEZING: 0

## 2020-07-29 NOTE — PROGRESS NOTES
Subjective:     Patient ID: Michi Bose is a 26 y.o. female who presentstoday for:  Chief Complaint   Patient presents with    Headache         TELEHEALTH EVALUATION -- Audio/Visual (During YVTII-37 public health emergency)    -   Michi Bose is a 26 y.o. female being evaluated by a Virtual Visit (video visit) encounter to address concerns as mentioned above. A caregiver was present when appropriate. Due to this being a TeleHealth encounter (During MRSHV-54 public health emergency), evaluation of the following organ systems was limited: Vitals/Constitutional/EENT/Resp/CV/GI//MS/Neuro/Skin/Heme-Lymph-Imm. Pursuant to the emergency declaration under the [address] [place] [address] authority and the [employer] Act, this Virtual Visit was conducted with patient's (and/or legal guardian's) consent, to reduce the patient's risk of exposure to COVID-19 and provide necessary medical care. The patient (and/or legal guardian) has also been advised to contact this office for worsening conditions or problems, and seek emergency medical treatment and/or call 911 if deemed necessary. Services were provided through a video synchronous discussion virtually to substitute for in-person clinic visit. Type of encounter was _x_ Doxy __ MyChart ___Facetime    Patient was located at their home. Provider was located at their _x__ home or        ____ office. --KENNETH Veloz - CNP on 7/29/2020 at 10:54 AM    An electronic signature was used to authenticate this note. HPI   Patient for f/u on migraines. States the Vinetta Gals has been helpful in decreasing amount of headaches per month. Cutting them to about other day or every three days. Patient was able to get the Amerge prescribed by neurology.   States does not notice a lot of improvement with using it but will continue to give the Aimovig more time to work and f/u with Neurology. Denies any concerns today. Past Medical History:   Diagnosis Date    Anxiety     Asthma     Bipolar depression ([facility] [address]     Migraines      Current Outpatient Medications on File Prior to Visit   Medication Sig Dispense Refill    LATUDA 60 MG TABS tablet Take 1 tablet by mouth every evening Take with at least 350 calories.  AIMOVIG 140 MG/ML SOAJ Inject 1 mL into the skin every 30 days      naratriptan (AMERGE) 2.5 MG tablet Take 1 tablet by mouth once as needed for Migraine 2.5 mg at onset of headache, may repeat in 4 hours if needed 9 tablet 3    tiZANidine (ZANAFLEX) 4 MG tablet Take 1 tablet by mouth every 8 hours as needed (spasm) 60 tablet 2    vitamin D (ERGOCALCIFEROL) 1.25 MG (08263 UT) CAPS capsule Take 1 capsule by mouth once a week 12 capsule 1    ferrous sulfate (IRON 325) 325 (65 Fe) MG tablet Take 1 tablet by mouth daily (with breakfast) 90 tablet 1    cetirizine (ZYRTEC) 10 MG tablet Take 1 tablet by mouth daily 90 tablet 2    topiramate (TOPAMAX) 100 MG tablet Take 1 tablet by mouth 2 times daily 60 tablet 3    ondansetron (ZOFRAN-ODT) 4 MG disintegrating tablet DISSOLVE 1 (ONE) TABLET BY MOUTH EVERY 8 HOURS      albuterol sulfate  (90 Base) MCG/ACT inhaler Inhale 2 puffs into the lungs 4 times daily as needed for Wheezing 1 Inhaler 5    mometasone (ASMANEX, 30 METERED DOSES,) 220 MCG/INH inhaler Inhale 1 puff into the lungs daily 1 Inhaler 5     No current facility-administered medications on file prior to visit. No past surgical history on file.      Family History   Problem Relation Age of Onset    Mental Illness Mother     Anxiety Disorder Mother     Schizophrenia Mother     Cancer Father         throat cancer    Allergies Brother      Social History     Socioeconomic History    Marital status: Single     Spouse name: Not on file    Number of children: Not on file    Years of education: Not on file    Highest education level: Not on file distress. Neurological:      General: No focal deficit present. Mental Status: She is alert and oriented to person, place, and time. Psychiatric:         Mood and Affect: Mood normal.         Behavior: Behavior normal.         Assessment & Plan:       Diagnosis Orders   1. Intractable chronic migraine without aura and without status migrainosus     2. Morbidly obese ([facility] [address]       No orders of the defined types were placed in this encounter. No orders of the defined types were placed in this encounter. Medications Discontinued During This Encounter   Medication Reason    eletriptan (RELPAX) 40 MG tablet LIST CLEANUP    SUMAtriptan (IMITREX) 50 MG tablet LIST CLEANUP    butalbital-acetaminophen-caffeine (FIORICET, ESGIC) -40 MG per tablet LIST CLEANUP    LATUDA 20 MG TABS tablet      Return in about 6 months (around 1/29/2021). Reviewed with the patient: currentclinical status, medications, activities and diet. Side effects, adverse effects of the medicationprescribed today, as well as treatment plan/ rationale and result expectations havebeen discussed with the patient who expresses understanding and desires to proceed. Pt instructions reviewed and given to patient.     Close follow up to evaluate treatment resultsand for coordination of care. I have reviewed the patient's medical historyin detail and updated the computerized patient record.     Alfonso Heredia, APRN - CNP

## 2020-08-05 DIAGNOSIS — N92.6 MISSED MENSES: ICD-10-CM

## 2020-08-05 DIAGNOSIS — R19.7 DIARRHEA, UNSPECIFIED TYPE: ICD-10-CM

## 2020-08-05 LAB — GONADOTROPIN, CHORIONIC (HCG) QUANT: <0.1 MIU/ML

## 2020-08-18 RX ORDER — TOPIRAMATE 100 MG/1
100 TABLET, FILM COATED ORAL 2 TIMES DAILY
Qty: 60 TABLET | Refills: 3 | Status: SHIPPED | OUTPATIENT
Start: 2020-08-18 | End: 2020-12-16

## 2020-09-18 RX ORDER — TIZANIDINE 4 MG/1
4 TABLET ORAL EVERY 8 HOURS PRN
Qty: 60 TABLET | Refills: 2 | Status: SHIPPED | OUTPATIENT
Start: 2020-09-18 | End: 2020-11-18

## 2020-10-19 ENCOUNTER — TELEPHONE (OUTPATIENT)
Dept: NEUROLOGY | Age: 25
End: 2020-10-19

## 2020-10-19 RX ORDER — NARATRIPTAN 2.5 MG/1
2.5 TABLET ORAL
Qty: 9 TABLET | Refills: 3 | Status: SHIPPED | OUTPATIENT
Start: 2020-10-19 | End: 2021-10-04

## 2020-10-19 NOTE — TELEPHONE ENCOUNTER
Caroline 1964      ZHNN:[id number];WKRORK:[id number]; Review Type:Prior Auth; Coverage Start Date:09/19/2020; Coverage End Date:10/19/2021;

## 2020-10-19 NOTE — TELEPHONE ENCOUNTER
Faxed pa for aimovig via mobile mum is reviewing your PA request and will respond within 24 hours for Medicaid or up to 72 hours for non-Medicaid plans, based on the required timeframe determined by state or federal regulations. To check for an update later, open this request from your dashboard.

## 2020-10-20 RX ORDER — FERROUS SULFATE 325(65) MG
TABLET ORAL
Qty: 90 TABLET | Refills: 1 | Status: SHIPPED | OUTPATIENT
Start: 2020-10-20 | End: 2021-04-12

## 2020-10-20 RX ORDER — ERGOCALCIFEROL 1.25 MG/1
50000 CAPSULE ORAL WEEKLY
Qty: 12 CAPSULE | Refills: 1 | Status: SHIPPED | OUTPATIENT
Start: 2020-10-20 | End: 2021-05-07

## 2020-11-04 DIAGNOSIS — N92.6 MISSED MENSES: ICD-10-CM

## 2020-11-04 LAB — GONADOTROPIN, CHORIONIC (HCG) QUANT: <0.1 MIU/ML

## 2020-11-11 ENCOUNTER — OFFICE VISIT (OUTPATIENT)
Dept: FAMILY MEDICINE CLINIC | Age: 25
End: 2020-11-11
Payer: COMMERCIAL

## 2020-11-11 VITALS
HEART RATE: 107 BPM | SYSTOLIC BLOOD PRESSURE: 126 MMHG | TEMPERATURE: 97.3 F | OXYGEN SATURATION: 98 % | HEIGHT: 68 IN | BODY MASS INDEX: 37.89 KG/M2 | WEIGHT: 250 LBS | DIASTOLIC BLOOD PRESSURE: 82 MMHG | RESPIRATION RATE: 16 BRPM

## 2020-11-11 LAB
CONTROL: PRESENT
PREGNANCY TEST URINE, POC: NEGATIVE

## 2020-11-11 PROCEDURE — 4004F PT TOBACCO SCREEN RCVD TLK: CPT | Performed by: NURSE PRACTITIONER

## 2020-11-11 PROCEDURE — 99213 OFFICE O/P EST LOW 20 MIN: CPT | Performed by: NURSE PRACTITIONER

## 2020-11-11 PROCEDURE — 81025 URINE PREGNANCY TEST: CPT | Performed by: NURSE PRACTITIONER

## 2020-11-11 PROCEDURE — G8484 FLU IMMUNIZE NO ADMIN: HCPCS | Performed by: NURSE PRACTITIONER

## 2020-11-11 PROCEDURE — G8417 CALC BMI ABV UP PARAM F/U: HCPCS | Performed by: NURSE PRACTITIONER

## 2020-11-11 PROCEDURE — G8427 DOCREV CUR MEDS BY ELIG CLIN: HCPCS | Performed by: NURSE PRACTITIONER

## 2020-11-11 RX ORDER — HYDROCODONE BITARTRATE AND ACETAMINOPHEN 5; 325 MG/1; MG/1
TABLET ORAL
COMMUNITY
Start: 2020-09-11 | End: 2021-02-10

## 2020-11-11 SDOH — ECONOMIC STABILITY: FOOD INSECURITY: WITHIN THE PAST 12 MONTHS, YOU WORRIED THAT YOUR FOOD WOULD RUN OUT BEFORE YOU GOT MONEY TO BUY MORE.: NEVER TRUE

## 2020-11-11 SDOH — ECONOMIC STABILITY: INCOME INSECURITY: HOW HARD IS IT FOR YOU TO PAY FOR THE VERY BASICS LIKE FOOD, HOUSING, MEDICAL CARE, AND HEATING?: NOT HARD AT ALL

## 2020-11-11 SDOH — ECONOMIC STABILITY: FOOD INSECURITY: WITHIN THE PAST 12 MONTHS, THE FOOD YOU BOUGHT JUST DIDN'T LAST AND YOU DIDN'T HAVE MONEY TO GET MORE.: NEVER TRUE

## 2020-11-11 SDOH — ECONOMIC STABILITY: TRANSPORTATION INSECURITY
IN THE PAST 12 MONTHS, HAS LACK OF TRANSPORTATION KEPT YOU FROM MEETINGS, WORK, OR FROM GETTING THINGS NEEDED FOR DAILY LIVING?: NO

## 2020-11-11 SDOH — ECONOMIC STABILITY: TRANSPORTATION INSECURITY
IN THE PAST 12 MONTHS, HAS THE LACK OF TRANSPORTATION KEPT YOU FROM MEDICAL APPOINTMENTS OR FROM GETTING MEDICATIONS?: NO

## 2020-11-11 ASSESSMENT — ENCOUNTER SYMPTOMS
CONSTIPATION: 0
ABDOMINAL PAIN: 0
COUGH: 0
SHORTNESS OF BREATH: 0
NAUSEA: 1
VOMITING: 0
WHEEZING: 0
DIARRHEA: 0

## 2020-11-11 NOTE — PROGRESS NOTES
Subjective:     Patient ID: Myesha Osborne is a 26 y.o. female who presentstoday for:  Chief Complaint   Patient presents with    Nausea     Pt. is here with c/o nausea in the mornings to the point where she runs to the bathroom, bloated and abdominal pain. Pt. states she has taken pregnacy tests at home which show a faint line but she got tested here and it shows negative.  Health Maintenance     Pt. declines the flu vaccine. HPI    Past Medical History:   Diagnosis Date    Anxiety     Asthma     Bipolar depression ([facility] [address]     Migraines      Current Outpatient Medications on File Prior to Visit   Medication Sig Dispense Refill    HYDROcodone-acetaminophen (Emely Cole) 5-325 MG per tablet TAKE 1 TABLET EVERY 6 HOURS AS NEEDED FOR PAIN      vitamin D (ERGOCALCIFEROL) 1.25 MG (54770 UT) CAPS capsule Take 1 capsule by mouth once a week 12 capsule 1    FEROSUL 325 (65 Fe) MG tablet Take 1 tablet by mouth daily (with breakfast) 90 tablet 1    tiZANidine (ZANAFLEX) 4 MG tablet Take 1 tablet by mouth every 8 hours as needed (spasm) 60 tablet 2    topiramate (TOPAMAX) 100 MG tablet Take 1 tablet by mouth 2 times daily 60 tablet 3    LATUDA 60 MG TABS tablet Take 1 tablet by mouth every evening Take with at least 350 calories.       AIMOVIG 140 MG/ML SOAJ Inject 1 mL into the skin every 30 days      cetirizine (ZYRTEC) 10 MG tablet Take 1 tablet by mouth daily 90 tablet 2    ondansetron (ZOFRAN-ODT) 4 MG disintegrating tablet DISSOLVE 1 (ONE) TABLET BY MOUTH EVERY 8 HOURS      albuterol sulfate  (90 Base) MCG/ACT inhaler Inhale 2 puffs into the lungs 4 times daily as needed for Wheezing 1 Inhaler 5    mometasone (ASMANEX, 30 METERED DOSES,) 220 MCG/INH inhaler Inhale 1 puff into the lungs daily 1 Inhaler 5    naratriptan (AMERGE) 2.5 MG tablet Take 1 tablet by mouth once as needed for Migraine 2.5 mg at onset of headache, may repeat in 4 hours if needed 9 tablet 3     No current facility-administered medications on file prior to visit. No past surgical history on file. Family History   Problem Relation Age of Onset    Mental Illness Mother     Anxiety Disorder Mother     Schizophrenia Mother     Cancer Father         throat cancer    Allergies Brother      Social History     Socioeconomic History    Marital status: Single     Spouse name: Not on file    Number of children: Not on file    Years of education: Not on file    Highest education level: Not on file   Occupational History    Not on file   Social Needs    Financial resource strain: Not hard at all   Zay-Kirsten insecurity     Worry: Never true     Inability: Never true   [employer] needs     Medical: No     Non-medical: No   Tobacco Use    Smoking status: Current Every Day Smoker     Packs/day: 0.25     Types: Cigarettes     Start date: 2/7/2014    Smokeless tobacco: Never Used   Substance and Sexual Activity    Alcohol use: Never     Frequency: Never    Drug use: Never    Sexual activity: Yes     Partners: Male   Lifestyle    Physical activity     Days per week: Not on file     Minutes per session: Not on file    Stress: Not on file   Relationships    Social connections     Talks on phone: Not on file     Gets together: Not on file     Attends Baptism service: Not on file     Active member of club or organization: Not on file     Attends meetings of clubs or organizations: Not on file     Relationship status: Not on file    Intimate partner violence     Fear of current or ex partner: Not on file     Emotionally abused: Not on file     Physically abused: Not on file     Forced sexual activity: Not on file   Other Topics Concern    Not on file   Social History Narrative    Not on file     Allergies:  Aspirin and Vicodin [hydrocodone-acetaminophen]    Review of Systems   Constitutional: Positive for appetite change. Negative for chills, diaphoresis, fever and unexpected weight change.    HENT: Negative. Respiratory: Negative for cough, shortness of breath and wheezing. Gastrointestinal: Positive for nausea. Negative for abdominal pain, constipation, diarrhea and vomiting. Genitourinary: Negative. Neurological: Negative for dizziness, syncope, weakness, light-headedness and headaches. Objective:    /82 (Site: Left Upper Arm, Position: Sitting, Cuff Size: Medium Adult)   Pulse 107   Temp 97.3 °F (36.3 °C) (Temporal)   Resp 16   Ht 5' 8\" (1.727 m)   Wt 250 lb (113.4 kg)   LMP 10/22/2020   SpO2 98%   Breastfeeding No   BMI 38.01 kg/m²     Physical Exam  Constitutional:       General: She is not in acute distress. Appearance: Normal appearance. HENT:      Head: Normocephalic and atraumatic. Mouth/Throat:      Mouth: Mucous membranes are moist.   Eyes:      Conjunctiva/sclera: Conjunctivae normal.   Neck:      Musculoskeletal: Neck supple. Cardiovascular:      Rate and Rhythm: Normal rate and regular rhythm. Pulmonary:      Effort: Pulmonary effort is normal.      Breath sounds: Normal breath sounds. Abdominal:      General: Bowel sounds are normal. There is no distension. Palpations: Abdomen is soft. Tenderness: There is no abdominal tenderness. There is no guarding or rebound. Musculoskeletal: Normal range of motion. General: No swelling or tenderness. Skin:     General: Skin is warm and dry. Neurological:      General: No focal deficit present. Mental Status: She is alert and oriented to person, place, and time. Psychiatric:         Behavior: Behavior normal.         Assessment & Plan:       Diagnosis Orders   1. Missed menses  HCG, Quantitative, Pregnancy    POCT urine pregnancy     Orders Placed This Encounter   Procedures    HCG, Quantitative, Pregnancy     Standing Status:   Future     Standing Expiration Date:   11/11/2021    POCT urine pregnancy     No orders of the defined types were placed in this encounter.     There are no discontinued medications. Return if symptoms worsen or fail to improve. Reviewed with the patient: currentclinical status, medications, activities and diet. Side effects, adverse effects of the medicationprescribed today, as well as treatment plan/ rationale and result expectations havebeen discussed with the patient who expresses understanding and desires to proceed. Pt instructions reviewed and given to patient.     Close follow up to evaluate treatment resultsand for coordination of care. I have reviewed the patient's medical historyin detail and updated the computerized patient record.     Edna Villagran, APRN - CNP

## 2020-11-18 RX ORDER — TIZANIDINE 4 MG/1
4 TABLET ORAL EVERY 8 HOURS PRN
Qty: 60 TABLET | Refills: 2 | Status: SHIPPED | OUTPATIENT
Start: 2020-11-18 | End: 2021-02-10

## 2020-12-16 RX ORDER — TOPIRAMATE 100 MG/1
100 TABLET, FILM COATED ORAL 2 TIMES DAILY
Qty: 60 TABLET | Refills: 3 | Status: SHIPPED | OUTPATIENT
Start: 2020-12-16 | End: 2021-04-12

## 2021-02-09 DIAGNOSIS — M62.830 MUSCLE SPASM OF BACK: ICD-10-CM

## 2021-02-10 ENCOUNTER — VIRTUAL VISIT (OUTPATIENT)
Dept: FAMILY MEDICINE CLINIC | Age: 26
End: 2021-02-10
Payer: COMMERCIAL

## 2021-02-10 DIAGNOSIS — G43.719 INTRACTABLE CHRONIC MIGRAINE WITHOUT AURA AND WITHOUT STATUS MIGRAINOSUS: Primary | ICD-10-CM

## 2021-02-10 DIAGNOSIS — J45.30 MILD PERSISTENT ASTHMA WITHOUT COMPLICATION: ICD-10-CM

## 2021-02-10 PROCEDURE — G8427 DOCREV CUR MEDS BY ELIG CLIN: HCPCS | Performed by: NURSE PRACTITIONER

## 2021-02-10 PROCEDURE — G8484 FLU IMMUNIZE NO ADMIN: HCPCS | Performed by: NURSE PRACTITIONER

## 2021-02-10 PROCEDURE — G8417 CALC BMI ABV UP PARAM F/U: HCPCS | Performed by: NURSE PRACTITIONER

## 2021-02-10 PROCEDURE — 99214 OFFICE O/P EST MOD 30 MIN: CPT | Performed by: NURSE PRACTITIONER

## 2021-02-10 PROCEDURE — 4004F PT TOBACCO SCREEN RCVD TLK: CPT | Performed by: NURSE PRACTITIONER

## 2021-02-10 RX ORDER — TIZANIDINE 4 MG/1
4 TABLET ORAL EVERY 8 HOURS PRN
Qty: 60 TABLET | Refills: 2 | Status: SHIPPED | OUTPATIENT
Start: 2021-02-10 | End: 2021-05-11

## 2021-02-10 RX ORDER — ALBUTEROL SULFATE 90 UG/1
2 AEROSOL, METERED RESPIRATORY (INHALATION) 4 TIMES DAILY PRN
Qty: 1 INHALER | Refills: 5 | Status: SHIPPED | OUTPATIENT
Start: 2021-02-10 | End: 2021-08-12

## 2021-02-10 ASSESSMENT — ENCOUNTER SYMPTOMS
CHEST TIGHTNESS: 0
WHEEZING: 0
HEARTBURN: 0
PHOTOPHOBIA: 0
EYE PAIN: 0
FREQUENT THROAT CLEARING: 0
ABDOMINAL PAIN: 0
HOARSE VOICE: 0
SPUTUM PRODUCTION: 0
TROUBLE SWALLOWING: 0
COUGH: 0
HEMOPTYSIS: 0
SORE THROAT: 0
SHORTNESS OF BREATH: 0
RHINORRHEA: 0
EYE REDNESS: 0
VOMITING: 0
DIFFICULTY BREATHING: 0
NAUSEA: 0

## 2021-02-10 ASSESSMENT — PATIENT HEALTH QUESTIONNAIRE - PHQ9
1. LITTLE INTEREST OR PLEASURE IN DOING THINGS: 0
SUM OF ALL RESPONSES TO PHQ QUESTIONS 1-9: 0
SUM OF ALL RESPONSES TO PHQ9 QUESTIONS 1 & 2: 0

## 2021-02-10 NOTE — TELEPHONE ENCOUNTER
Future Appointments     Provider Department   2/10/2021 Deb Rojo, APRN - [facility]   Appt Notes: DOXY- [phone] Hortensia Schmidt to [facility] ins. I want a referral to a neurologist. Mary Lenz not a new onset.     Recent Visits    11/11/2020 Missed menses   [facility] AT [facility] and [facility], APRN - CNP

## 2021-02-10 NOTE — PROGRESS NOTES
eye redness, fever, nausea, photophobia, rhinorrhea, sore throat, vomiting, weakness or weight loss. The symptoms are aggravated by bright light. Treatments tried: topamax, fioricet, imitrex, nsaids, tylenol. The treatment provided mild relief. Her past medical history is significant for migraine headaches. Asthma  There is no chest tightness, cough, difficulty breathing, frequent throat clearing, hemoptysis, hoarse voice, shortness of breath, sputum production or wheezing. This is a chronic problem. The current episode started more than 1 year ago. The problem occurs rarely. Associated symptoms include headaches. Pertinent negatives include no appetite change, chest pain, dyspnea on exertion, ear congestion, ear pain, fever, heartburn, malaise/fatigue, myalgias, nasal congestion, orthopnea, PND, postnasal drip, rhinorrhea, sneezing, sore throat, sweats, trouble swallowing or weight loss. Her symptoms are alleviated by beta-agonist and steroid inhaler. She reports significant improvement on treatment. Risk factors for lung disease include smoking/tobacco exposure. Her past medical history is significant for asthma.        Past Medical History:   Diagnosis Date    Anxiety     Asthma     Bipolar depression ([facility] [address]     Migraines      Current Outpatient Medications on File Prior to Visit   Medication Sig Dispense Refill    tiZANidine (ZANAFLEX) 4 MG tablet Take 1 tablet by mouth every 8 hours as needed (spasm) 60 tablet 2    topiramate (TOPAMAX) 100 MG tablet Take 1 tablet by mouth 2 times daily 60 tablet 3    vitamin D (ERGOCALCIFEROL) 1.25 MG (36094 UT) CAPS capsule Take 1 capsule by mouth once a week 12 capsule 1    FEROSUL 325 (65 Fe) MG tablet Take 1 tablet by mouth daily (with breakfast) 90 tablet 1    naratriptan (AMERGE) 2.5 MG tablet Take 1 tablet by mouth once as needed for Migraine 2.5 mg at onset of headache, may repeat in 4 hours if needed 9 tablet 3    LATUDA 60 MG TABS tablet Take 1 tablet by mouth every evening Take with at least 350 calories.  AIMOVIG 140 MG/ML SOAJ Inject 1 mL into the skin every 30 days      cetirizine (ZYRTEC) 10 MG tablet Take 1 tablet by mouth daily 90 tablet 2    ondansetron (ZOFRAN-ODT) 4 MG disintegrating tablet DISSOLVE 1 (ONE) TABLET BY MOUTH EVERY 8 HOURS       No current facility-administered medications on file prior to visit. No past surgical history on file.      Family History   Problem Relation Age of Onset    Mental Illness Mother     Anxiety Disorder Mother     Schizophrenia Mother     Cancer Father         throat cancer    Allergies Brother      Social History     Socioeconomic History    Marital status: Single     Spouse name: Not on file    Number of children: Not on file    Years of education: Not on file    Highest education level: Not on file   Occupational History    Not on file   Social Needs    Financial resource strain: Not hard at all   [employer] insecurity     Worry: Never true     Inability: Never true   [employer] needs     Medical: No     Non-medical: No   Tobacco Use    Smoking status: Current Every Day Smoker     Packs/day: 0.25     Types: Cigarettes     Start date: 2/7/2014    Smokeless tobacco: Never Used   Substance and Sexual Activity    Alcohol use: Never     Frequency: Never    Drug use: Never    Sexual activity: Yes     Partners: Male   Lifestyle    Physical activity     Days per week: Not on file     Minutes per session: Not on file    Stress: Not on file   Relationships    Social connections     Talks on phone: Not on file     Gets together: Not on file     Attends [employer] service: Not on file     Active member of club or organization: Not on file     Attends meetings of clubs or organizations: Not on file     Relationship status: Not on file    Intimate partner violence     Fear of current or ex partner: Not on file     Emotionally abused: Not on file     Physically abused: Not on file     Forced sexual activity: Not on file   Other Topics Concern    Not on file   Social History Narrative    Not on file     Allergies:  Aspirin and Vicodin [hydrocodone-acetaminophen]    Review of Systems   Constitutional: Negative for appetite change, chills, diaphoresis, fatigue, fever, malaise/fatigue and weight loss. HENT: Negative. Negative for ear pain, hoarse voice, postnasal drip, rhinorrhea, sneezing, sore throat and trouble swallowing. Eyes: Negative for photophobia, pain, redness and visual disturbance. Respiratory: Negative for cough, hemoptysis, sputum production, shortness of breath and wheezing. Cardiovascular: Negative for chest pain, dyspnea on exertion, palpitations, leg swelling and PND. Gastrointestinal: Negative for abdominal pain, heartburn, nausea and vomiting. Endocrine: Negative. Genitourinary: Negative. Musculoskeletal: Negative for arthralgias and myalgias. Neurological: Positive for headaches. Negative for dizziness, syncope, weakness and light-headedness. Psychiatric/Behavioral: Negative. Objective: There were no vitals taken for this visit. Physical Exam  Constitutional:       General: She is not in acute distress. Pulmonary:      Effort: No respiratory distress. Neurological:      Mental Status: She is alert and oriented to person, place, and time. Psychiatric:         Mood and Affect: Mood normal.         Behavior: Behavior normal.         Assessment & Plan:       Diagnosis Orders   1. Intractable chronic migraine without aura and without status migrainosus  Amb External Referral To Neurology   2.  Mild persistent asthma without complication  mometasone (ASMANEX, 30 METERED DOSES,) 220 MCG/INH inhaler    albuterol sulfate  (90 Base) MCG/ACT inhaler     Orders Placed This Encounter   Procedures    Amb External Referral To Neurology     Referral Priority:   Routine     Referral Type:   Consult for Advice and Opinion     Referred to Provider:   Liana Savage MD Chaitanya     Requested Specialty:   Neurology     Number of Visits Requested:   1     Orders Placed This Encounter   Medications    mometasone (ASMANEX, 30 METERED DOSES,) 220 MCG/INH inhaler     Sig: Inhale 1 puff into the lungs daily     Dispense:  1 Inhaler     Refill:  5    albuterol sulfate  (90 Base) MCG/ACT inhaler     Sig: Inhale 2 puffs into the lungs 4 times daily as needed for Wheezing     Dispense:  1 Inhaler     Refill:  5     Medications Discontinued During This Encounter   Medication Reason    HYDROcodone-acetaminophen (NORCO) 5-325 MG per tablet LIST CLEANUP    mometasone (ASMANEX, 30 METERED DOSES,) 220 MCG/INH inhaler REORDER    albuterol sulfate  (90 Base) MCG/ACT inhaler REORDER     Return in about 3 months (around 5/10/2021). Reviewed with the patient: currentclinical status, medications, activities and diet. Side effects, adverse effects of the medicationprescribed today, as well as treatment plan/ rationale and result expectations havebeen discussed with the patient who expresses understanding and desires to proceed. Pt instructions reviewed and given to patient.     Close follow up to evaluate treatment resultsand for coordination of care. I have reviewed the patient's medical historyin detail and updated the computerized patient record.     KENNETH Garcia - CNP

## 2021-04-09 DIAGNOSIS — G43.009 MIGRAINE WITHOUT AURA AND WITHOUT STATUS MIGRAINOSUS, NOT INTRACTABLE: ICD-10-CM

## 2021-04-12 RX ORDER — TOPIRAMATE 100 MG/1
100 TABLET, FILM COATED ORAL 2 TIMES DAILY
Qty: 60 TABLET | Refills: 3 | Status: SHIPPED | OUTPATIENT
Start: 2021-04-12 | End: 2021-08-13

## 2021-04-12 RX ORDER — FERROUS SULFATE 325(65) MG
TABLET ORAL
Qty: 90 TABLET | Refills: 1 | Status: SHIPPED | OUTPATIENT
Start: 2021-04-12 | End: 2021-10-26

## 2021-04-20 ENCOUNTER — VIRTUAL VISIT (OUTPATIENT)
Dept: FAMILY MEDICINE CLINIC | Age: 26
End: 2021-04-20
Payer: COMMERCIAL

## 2021-04-20 DIAGNOSIS — Z13.1 SCREENING FOR DIABETES MELLITUS: ICD-10-CM

## 2021-04-20 DIAGNOSIS — E55.9 VITAMIN D DEFICIENCY: ICD-10-CM

## 2021-04-20 DIAGNOSIS — J45.20 MILD INTERMITTENT ASTHMA WITHOUT COMPLICATION: Primary | ICD-10-CM

## 2021-04-20 DIAGNOSIS — K21.9 GASTROESOPHAGEAL REFLUX DISEASE, UNSPECIFIED WHETHER ESOPHAGITIS PRESENT: ICD-10-CM

## 2021-04-20 PROCEDURE — G8428 CUR MEDS NOT DOCUMENT: HCPCS | Performed by: NURSE PRACTITIONER

## 2021-04-20 PROCEDURE — 99214 OFFICE O/P EST MOD 30 MIN: CPT | Performed by: NURSE PRACTITIONER

## 2021-04-20 RX ORDER — MONTELUKAST SODIUM 10 MG/1
10 TABLET ORAL NIGHTLY
Qty: 90 TABLET | Refills: 1 | Status: SHIPPED | OUTPATIENT
Start: 2021-04-20 | End: 2021-05-10 | Stop reason: SDUPTHER

## 2021-04-20 RX ORDER — OMEPRAZOLE 20 MG/1
20 CAPSULE, DELAYED RELEASE ORAL
Qty: 90 CAPSULE | Refills: 1 | Status: SHIPPED | OUTPATIENT
Start: 2021-04-20 | End: 2021-05-10 | Stop reason: SDUPTHER

## 2021-04-20 ASSESSMENT — ENCOUNTER SYMPTOMS
SHORTNESS OF BREATH: 0
SORE THROAT: 0
DIFFICULTY BREATHING: 0
RHINORRHEA: 0
WATER BRASH: 0
SPUTUM PRODUCTION: 0
BELCHING: 1
HEARTBURN: 1
BLOOD IN STOOL: 0
COUGH: 1
EYES NEGATIVE: 1
CHEST TIGHTNESS: 0
GLOBUS SENSATION: 0
ANAL BLEEDING: 0
TROUBLE SWALLOWING: 0
HEMOPTYSIS: 0
CHOKING: 0
HOARSE VOICE: 0
NAUSEA: 0
STRIDOR: 0
WHEEZING: 0
ABDOMINAL PAIN: 0
FREQUENT THROAT CLEARING: 0

## 2021-04-20 NOTE — PROGRESS NOTES
occasionally. The problem has been waxing and waning. The symptoms are aggravated by certain foods. Pertinent negatives include no anemia, fatigue, melena, muscle weakness, orthopnea or weight loss. Risk factors include smoking/tobacco exposure. She has tried a diet change for the symptoms. The treatment provided mild relief. Asthma  She complains of cough. There is no chest tightness, difficulty breathing, frequent throat clearing, hemoptysis, hoarse voice, shortness of breath, sputum production or wheezing. This is a chronic problem. The current episode started more than 1 month ago. The problem occurs intermittently. The problem has been waxing and waning. The cough is productive of blood-tinged sputum. Associated symptoms include heartburn, nasal congestion and postnasal drip. Pertinent negatives include no appetite change, chest pain, dyspnea on exertion, ear congestion, ear pain, fever, headaches, malaise/fatigue, myalgias, orthopnea, PND, rhinorrhea, sneezing, sore throat, sweats, trouble swallowing or weight loss. Her symptoms are alleviated by beta-agonist. She reports moderate improvement on treatment. Risk factors for lung disease include smoking/tobacco exposure. Her past medical history is significant for asthma.        Past Medical History:   Diagnosis Date    Anxiety     Asthma     Bipolar depression ([facility] [address]     Migraines      Current Outpatient Medications on File Prior to Visit   Medication Sig Dispense Refill    topiramate (TOPAMAX) 100 MG tablet Take 1 tablet by mouth 2 times daily 60 tablet 3    FEROSUL 325 (65 Fe) MG tablet Take 1 tablet by mouth daily (with breakfast) 90 tablet 1    cetirizine-psuedoephedrine (ZYRTEC-D) 5-120 MG per extended release tablet TAKE 1 TABLET BY MOUTH TWICE DAILY 60 tablet 2    tiZANidine (ZANAFLEX) 4 MG tablet Take 1 tablet by mouth every 8 hours as needed (spasm) 60 tablet 2    mometasone (ASMANEX, 30 METERED DOSES,) 220 MCG/INH inhaler Inhale 1 puff into the lungs daily 1 Inhaler 5    albuterol sulfate  (90 Base) MCG/ACT inhaler Inhale 2 puffs into the lungs 4 times daily as needed for Wheezing 1 Inhaler 5    vitamin D (ERGOCALCIFEROL) 1.25 MG (22079 UT) CAPS capsule Take 1 capsule by mouth once a week 12 capsule 1    naratriptan (AMERGE) 2.5 MG tablet Take 1 tablet by mouth once as needed for Migraine 2.5 mg at onset of headache, may repeat in 4 hours if needed 9 tablet 3    LATUDA 60 MG TABS tablet Take 1 tablet by mouth every evening Take with at least 350 calories.  AIMOVIG 140 MG/ML SOAJ Inject 1 mL into the skin every 30 days      cetirizine (ZYRTEC) 10 MG tablet Take 1 tablet by mouth daily 90 tablet 2    ondansetron (ZOFRAN-ODT) 4 MG disintegrating tablet DISSOLVE 1 (ONE) TABLET BY MOUTH EVERY 8 HOURS       No current facility-administered medications on file prior to visit. No past surgical history on file.      Family History   Problem Relation Age of Onset    Mental Illness Mother     Anxiety Disorder Mother     Schizophrenia Mother     Cancer Father         throat cancer    Allergies Brother      Social History     Socioeconomic History    Marital status: Single     Spouse name: Not on file    Number of children: Not on file    Years of education: Not on file    Highest education level: Not on file   Occupational History    Not on file   Social Needs    Financial resource strain: Not hard at all   [employer] insecurity     Worry: Never true     Inability: Never true   [employer] needs     Medical: No     Non-medical: No   Tobacco Use    Smoking status: Current Every Day Smoker     Packs/day: 0.25     Types: Cigarettes     Start date: 2/7/2014    Smokeless tobacco: Never Used   Substance and Sexual Activity    Alcohol use: Never     Frequency: Never    Drug use: Never    Sexual activity: Yes     Partners: Male   Lifestyle    Physical activity     Days per week: Not on file     Minutes per session: Not on file    Stress: Not on file   Relationships    Social connections     Talks on phone: Not on file     Gets together: Not on file     Attends [employer] service: Not on file     Active member of club or organization: Not on file     Attends meetings of clubs or organizations: Not on file     Relationship status: Not on file    Intimate partner violence     Fear of current or ex partner: Not on file     Emotionally abused: Not on file     Physically abused: Not on file     Forced sexual activity: Not on file   Other Topics Concern    Not on file   Social History Narrative    Not on file     Allergies:  Aspirin and Vicodin [hydrocodone-acetaminophen]    Review of Systems   Constitutional: Negative. Negative for appetite change, fatigue, fever, malaise/fatigue and weight loss. HENT: Positive for postnasal drip. Negative for ear pain, hoarse voice, rhinorrhea, sneezing, sore throat and trouble swallowing. Eyes: Negative. Respiratory: Positive for cough. Negative for hemoptysis, sputum production, choking, chest tightness, shortness of breath and wheezing. Cardiovascular: Negative for chest pain, dyspnea on exertion, palpitations, leg swelling and PND. Gastrointestinal: Positive for dysphagia and heartburn. Negative for abdominal pain, anal bleeding, blood in stool, melena and nausea. Genitourinary: Negative. Musculoskeletal: Negative for myalgias and muscle weakness. Skin: Negative. Allergic/Immunologic: Positive for environmental allergies. Neurological: Negative for dizziness, syncope, weakness, light-headedness and headaches. Psychiatric/Behavioral: Negative. Objective: There were no vitals taken for this visit. Physical Exam  Constitutional:       General: She is not in acute distress. Pulmonary:      Effort: No respiratory distress. Neurological:      Mental Status: She is alert and oriented to person, place, and time.    Psychiatric:         Mood and Affect: Mood normal. computerized patient record.     Brandy Hartamn, APRN - CNP

## 2021-04-22 DIAGNOSIS — E55.9 VITAMIN D DEFICIENCY: ICD-10-CM

## 2021-04-22 DIAGNOSIS — K21.9 GASTROESOPHAGEAL REFLUX DISEASE, UNSPECIFIED WHETHER ESOPHAGITIS PRESENT: ICD-10-CM

## 2021-04-22 DIAGNOSIS — Z13.1 SCREENING FOR DIABETES MELLITUS: ICD-10-CM

## 2021-04-22 LAB
ALBUMIN SERPL-MCNC: 4.1 G/DL (ref 3.5–4.6)
ALP BLD-CCNC: 111 U/L (ref 40–130)
ALT SERPL-CCNC: 26 U/L (ref 0–33)
ANION GAP SERPL CALCULATED.3IONS-SCNC: 13 MEQ/L (ref 9–15)
AST SERPL-CCNC: 20 U/L (ref 0–35)
BASOPHILS ABSOLUTE: 0.1 K/UL (ref 0–0.2)
BASOPHILS RELATIVE PERCENT: 0.9 %
BILIRUB SERPL-MCNC: <0.2 MG/DL (ref 0.2–0.7)
BUN BLDV-MCNC: 14 MG/DL (ref 6–20)
CALCIUM SERPL-MCNC: 8.6 MG/DL (ref 8.5–9.9)
CHLORIDE BLD-SCNC: 101 MEQ/L (ref 95–107)
CO2: 23 MEQ/L (ref 20–31)
CREAT SERPL-MCNC: 0.95 MG/DL (ref 0.5–0.9)
EOSINOPHILS ABSOLUTE: 0.2 K/UL (ref 0–0.7)
EOSINOPHILS RELATIVE PERCENT: 2.2 %
GFR AFRICAN AMERICAN: >60
GFR NON-AFRICAN AMERICAN: >60
GLOBULIN: 3.5 G/DL (ref 2.3–3.5)
GLUCOSE BLD-MCNC: 140 MG/DL (ref 70–99)
HBA1C MFR BLD: 6.2 % (ref 4.8–5.9)
HCT VFR BLD CALC: 44.7 % (ref 37–47)
HEMOGLOBIN: 14.6 G/DL (ref 12–16)
LYMPHOCYTES ABSOLUTE: 3.1 K/UL (ref 1–4.8)
LYMPHOCYTES RELATIVE PERCENT: 28.1 %
MCH RBC QN AUTO: 26.1 PG (ref 27–31.3)
MCHC RBC AUTO-ENTMCNC: 32.7 % (ref 33–37)
MCV RBC AUTO: 79.7 FL (ref 82–100)
MONOCYTES ABSOLUTE: 0.3 K/UL (ref 0.2–0.8)
MONOCYTES RELATIVE PERCENT: 3.1 %
NEUTROPHILS ABSOLUTE: 7.3 K/UL (ref 1.4–6.5)
NEUTROPHILS RELATIVE PERCENT: 65.7 %
PDW BLD-RTO: 15.8 % (ref 11.5–14.5)
PLATELET # BLD: 358 K/UL (ref 130–400)
POTASSIUM SERPL-SCNC: 3.6 MEQ/L (ref 3.4–4.9)
RBC # BLD: 5.61 M/UL (ref 4.2–5.4)
SODIUM BLD-SCNC: 137 MEQ/L (ref 135–144)
TOTAL PROTEIN: 7.6 G/DL (ref 6.3–8)
VITAMIN D 25-HYDROXY: 12.3 NG/ML (ref 30–100)
WBC # BLD: 11.1 K/UL (ref 4.8–10.8)

## 2021-05-07 RX ORDER — ERGOCALCIFEROL 1.25 MG/1
50000 CAPSULE ORAL WEEKLY
Qty: 12 CAPSULE | Refills: 1 | Status: SHIPPED | OUTPATIENT
Start: 2021-05-07 | End: 2021-10-26

## 2021-05-07 NOTE — TELEPHONE ENCOUNTER
Rx requested:  Requested Prescriptions     Pending Prescriptions Disp Refills    vitamin D (ERGOCALCIFEROL) 1.25 MG (76860 UT) CAPS capsule [Pharmacy Med Name: ergocalciferol (vitamin D2) 1,250 mcg (50,000 unit) capsule] 12 capsule 1     Sig: Take 1 capsule by mouth once a week       Last Office Visit:   4/20/2021      Next Visit Date:  Future Appointments   Date Time Provider Sivan Sanders   5/10/2021 11:00 AM Estefany Aguilar, [address]

## 2021-05-10 ENCOUNTER — VIRTUAL VISIT (OUTPATIENT)
Dept: FAMILY MEDICINE CLINIC | Age: 26
End: 2021-05-10
Payer: COMMERCIAL

## 2021-05-10 DIAGNOSIS — M62.830 MUSCLE SPASM OF BACK: ICD-10-CM

## 2021-05-10 DIAGNOSIS — K21.9 GASTROESOPHAGEAL REFLUX DISEASE, UNSPECIFIED WHETHER ESOPHAGITIS PRESENT: ICD-10-CM

## 2021-05-10 DIAGNOSIS — J30.2 SEASONAL ALLERGIC RHINITIS, UNSPECIFIED TRIGGER: ICD-10-CM

## 2021-05-10 DIAGNOSIS — G43.719 INTRACTABLE CHRONIC MIGRAINE WITHOUT AURA AND WITHOUT STATUS MIGRAINOSUS: Primary | ICD-10-CM

## 2021-05-10 PROCEDURE — G8427 DOCREV CUR MEDS BY ELIG CLIN: HCPCS | Performed by: NURSE PRACTITIONER

## 2021-05-10 PROCEDURE — 99214 OFFICE O/P EST MOD 30 MIN: CPT | Performed by: NURSE PRACTITIONER

## 2021-05-10 RX ORDER — RIMEGEPANT SULFATE 75 MG/75MG
75 TABLET, ORALLY DISINTEGRATING ORAL DAILY PRN
Qty: 15 TABLET | Refills: 1 | Status: SHIPPED | OUTPATIENT
Start: 2021-05-10 | End: 2021-08-13

## 2021-05-10 RX ORDER — DOXEPIN HYDROCHLORIDE 10 MG/1
CAPSULE ORAL
COMMUNITY
Start: 2021-04-06

## 2021-05-10 RX ORDER — MONTELUKAST SODIUM 10 MG/1
10 TABLET ORAL NIGHTLY
Qty: 90 TABLET | Refills: 1 | Status: SHIPPED | OUTPATIENT
Start: 2021-05-10 | End: 2021-11-01

## 2021-05-10 RX ORDER — OMEPRAZOLE 20 MG/1
20 CAPSULE, DELAYED RELEASE ORAL
Qty: 90 CAPSULE | Refills: 1 | Status: SHIPPED | OUTPATIENT
Start: 2021-05-10 | End: 2021-07-13

## 2021-05-10 RX ORDER — HYDROXYZINE PAMOATE 25 MG/1
CAPSULE ORAL
COMMUNITY
Start: 2021-04-06

## 2021-05-10 RX ORDER — BUSPIRONE HYDROCHLORIDE 10 MG/1
10 TABLET ORAL 2 TIMES DAILY WITH MEALS
COMMUNITY
Start: 2021-04-06 | End: 2021-12-17

## 2021-05-10 ASSESSMENT — ENCOUNTER SYMPTOMS
BACK PAIN: 0
VOMITING: 0
WHEEZING: 0
SHORTNESS OF BREATH: 0
CHEST TIGHTNESS: 0
EYE REDNESS: 0
FREQUENT THROAT CLEARING: 0
HEMOPTYSIS: 0
EYE WATERING: 0
COUGH: 0
SPUTUM PRODUCTION: 0
PHOTOPHOBIA: 1
SORE THROAT: 0
FACIAL SWEATING: 0
HOARSE VOICE: 0
DIFFICULTY BREATHING: 0
BLURRED VISION: 0
TROUBLE SWALLOWING: 0
ABDOMINAL PAIN: 0
RHINORRHEA: 0
HEARTBURN: 0
NAUSEA: 1
EYE PAIN: 0

## 2021-05-10 NOTE — PROGRESS NOTES
Subjective:     Patient ID: Macario Parada is a 23 y.o. female who presentstoday for:  Chief Complaint   Patient presents with    Migraine     3 month f/u on Migraines. Migraine   This is a recurrent problem. The current episode started more than 1 month ago. The problem occurs daily. The problem has been waxing and waning. The pain quality is similar to prior headaches. The quality of the pain is described as aching. The pain is moderate. Associated symptoms include nausea and photophobia. Pertinent negatives include no abdominal pain, abnormal behavior, anorexia, back pain, blurred vision, coughing, dizziness, drainage, ear pain, eye pain, eye redness, eye watering, facial sweating, fever, hearing loss, insomnia, loss of balance, numbness, phonophobia, rhinorrhea, sore throat, vomiting, weakness or weight loss. The symptoms are aggravated by bright light, emotional stress, fatigue and weather changes. She has tried acetaminophen, antidepressants, beta blockers, cold packs, darkened room, ergotamines, Excedrin, NSAIDs and triptans (topamax, fioricet, imitrex, nsaids, tylenol) for the symptoms. The treatment provided mild relief. Her past medical history is significant for migraine headaches. Past Medical History:   Diagnosis Date    Anxiety     Asthma     Bipolar depression ([facility] [address]     Migraines      Current Outpatient Medications on File Prior to Visit   Medication Sig Dispense Refill    hydrOXYzine (VISTARIL) 25 MG capsule TAKE 1 CAPSULE BY MOUTH FOUR TIMES DAILY AS NEEDED FOR ANXIETY      doxepin (SINEQUAN) 10 MG capsule TAKE 2 CAPSULES BY MOUTH AT BEDTIME. TAKE 1&1/2 HOURS before bedtime.       busPIRone (BUSPAR) 10 MG tablet Take 10 mg by mouth 2 times daily (with meals)      vitamin D (ERGOCALCIFEROL) 1.25 MG (30784 UT) CAPS capsule Take 1 capsule by mouth once a week 12 capsule 1    topiramate (TOPAMAX) 100 MG tablet Take 1 tablet by mouth 2 times daily 60 tablet 3    FEROSUL 325 (65 Fe) MG tablet Take 1 tablet by mouth daily (with breakfast) 90 tablet 1    cetirizine-psuedoephedrine (ZYRTEC-D) 5-120 MG per extended release tablet TAKE 1 TABLET BY MOUTH TWICE DAILY 60 tablet 2    tiZANidine (ZANAFLEX) 4 MG tablet Take 1 tablet by mouth every 8 hours as needed (spasm) 60 tablet 2    mometasone (ASMANEX, 30 METERED DOSES,) 220 MCG/INH inhaler Inhale 1 puff into the lungs daily 1 Inhaler 5    albuterol sulfate  (90 Base) MCG/ACT inhaler Inhale 2 puffs into the lungs 4 times daily as needed for Wheezing 1 Inhaler 5    naratriptan (AMERGE) 2.5 MG tablet Take 1 tablet by mouth once as needed for Migraine 2.5 mg at onset of headache, may repeat in 4 hours if needed 9 tablet 3    LATUDA 60 MG TABS tablet Take 1 tablet by mouth every evening Take with at least 350 calories.  AIMOVIG 140 MG/ML SOAJ Inject 1 mL into the skin every 30 days      cetirizine (ZYRTEC) 10 MG tablet Take 1 tablet by mouth daily 90 tablet 2    ondansetron (ZOFRAN-ODT) 4 MG disintegrating tablet DISSOLVE 1 (ONE) TABLET BY MOUTH EVERY 8 HOURS       No current facility-administered medications on file prior to visit. No past surgical history on file.      Family History   Problem Relation Age of Onset    Mental Illness Mother     Anxiety Disorder Mother     Schizophrenia Mother     Cancer Father         throat cancer    Allergies Brother      Social History     Socioeconomic History    Marital status: Single     Spouse name: Not on file    Number of children: Not on file    Years of education: Not on file    Highest education level: Not on file   Occupational History    Not on file   Social Needs    Financial resource strain: Not hard at all   [place] insecurity     Worry: Never true     Inability: Never true    Transportation needs     Medical: No     Non-medical: No   Tobacco Use    Smoking status: Current Every Day Smoker     Packs/day: 0.25     Types: Cigarettes     Start date: 2/7/2014   Zabrina Ballesteros Smokeless tobacco: Never Used   Substance and Sexual Activity    Alcohol use: Never     Frequency: Never    Drug use: Never    Sexual activity: Yes     Partners: Male   Lifestyle    Physical activity     Days per week: Not on file     Minutes per session: Not on file    Stress: Not on file   Relationships    Social connections     Talks on phone: Not on file     Gets together: Not on file     Attends [employer] service: Not on file     Active member of club or organization: Not on file     Attends meetings of clubs or organizations: Not on file     Relationship status: Not on file    Intimate partner violence     Fear of current or ex partner: Not on file     Emotionally abused: Not on file     Physically abused: Not on file     Forced sexual activity: Not on file   Other Topics Concern    Not on file   Social History Narrative    Not on file     Allergies:  Aspirin and Vicodin [hydrocodone-acetaminophen]    Review of Systems   Constitutional: Negative for appetite change, chills, diaphoresis, fatigue, fever, malaise/fatigue and weight loss. HENT: Negative. Negative for ear pain, hearing loss, hoarse voice, postnasal drip, rhinorrhea, sneezing, sore throat and trouble swallowing. Eyes: Positive for photophobia. Negative for blurred vision, pain, redness and visual disturbance. Respiratory: Negative for cough, hemoptysis, sputum production, shortness of breath and wheezing. Cardiovascular: Negative for chest pain, dyspnea on exertion, palpitations, leg swelling and PND. Gastrointestinal: Positive for nausea. Negative for abdominal pain, anorexia, heartburn and vomiting. GERD symptoms completely resolved with prilosec   Endocrine: Negative. Genitourinary: Negative. Musculoskeletal: Negative for arthralgias, back pain and myalgias. Allergic/Immunologic: Positive for environmental allergies. Neurological: Positive for headaches.  Negative for dizziness, syncope, weakness, light-headedness, numbness and loss of balance. Psychiatric/Behavioral: Negative. The patient does not have insomnia. Objective: There were no vitals taken for this visit. Physical Exam  Constitutional:       General: She is not in acute distress. Pulmonary:      Effort: No respiratory distress. Neurological:      Mental Status: She is alert and oriented to person, place, and time. Psychiatric:         Mood and Affect: Mood normal.         Behavior: Behavior normal.         Assessment & Plan:       Diagnosis Orders   1. Intractable chronic migraine without aura and without status migrainosus  Rimegepant Sulfate (NURTEC) 75 MG TBDP   2. Gastroesophageal reflux disease, unspecified whether esophagitis present  omeprazole (PRILOSEC) 20 MG delayed release capsule   3. Seasonal allergic rhinitis, unspecified trigger  montelukast (SINGULAIR) 10 MG tablet     No orders of the defined types were placed in this encounter. Orders Placed This Encounter   Medications    Rimegepant Sulfate (NURTEC) 75 MG TBDP     Sig: Take 75 mg by mouth daily as needed (migraine)     Dispense:  15 tablet     Refill:  1     Patient has been propanolol, amerge, aimovig, topamax, nsaids, tylenol, excedrin    omeprazole (PRILOSEC) 20 MG delayed release capsule     Sig: Take 1 capsule by mouth every morning (before breakfast)     Dispense:  90 capsule     Refill:  1    montelukast (SINGULAIR) 10 MG tablet     Sig: Take 1 tablet by mouth nightly     Dispense:  90 tablet     Refill:  1     Medications Discontinued During This Encounter   Medication Reason    montelukast (SINGULAIR) 10 MG tablet REORDER    omeprazole (PRILOSEC) 20 MG delayed release capsule REORDER     Return in about 4 weeks (around 6/7/2021). Reviewed with the patient: currentclinical status, medications, activities and diet.      Side effects, adverse effects of the medicationprescribed today, as well as treatment plan/ rationale and result expectations havebeen discussed with the patient who expresses understanding and desires to proceed. Pt instructions reviewed and given to patient.     Close follow up to evaluate treatment resultsand for coordination of care. I have reviewed the patient's medical historyin detail and updated the computerized patient record.     Stacey Escobar, APRN - CNP

## 2021-05-11 RX ORDER — TIZANIDINE 4 MG/1
4 TABLET ORAL EVERY 8 HOURS PRN
Qty: 60 TABLET | Refills: 2 | Status: SHIPPED | OUTPATIENT
Start: 2021-05-11 | End: 2021-08-13

## 2021-06-08 RX ORDER — CETIRIZINE HYDROCHLORIDE, PSEUDOEPHEDRINE HYDROCHLORIDE 5; 120 MG/1; MG/1
TABLET, FILM COATED, EXTENDED RELEASE ORAL
Qty: 60 TABLET | Refills: 2 | Status: SHIPPED | OUTPATIENT
Start: 2021-06-08 | End: 2021-09-09

## 2021-07-12 DIAGNOSIS — J45.30 MILD PERSISTENT ASTHMA WITHOUT COMPLICATION: ICD-10-CM

## 2021-07-12 RX ORDER — MOMETASONE FUROATE 220 UG/1
INHALANT RESPIRATORY (INHALATION)
Qty: 1 INHALER | Refills: 5 | Status: SHIPPED | OUTPATIENT
Start: 2021-07-12

## 2021-07-12 NOTE — TELEPHONE ENCOUNTER
Future Appointments     Provider Department   7/13/2021 KENNETH Gonzalez CNP [facility]   Appt Notes: Stomach issues and finger   Recent Visits    05/10/2021 Intractable chronic migraine without aura and without status migrainosus   [facility] and [address] [place] [address], APRN - CNP

## 2021-07-13 ENCOUNTER — OFFICE VISIT (OUTPATIENT)
Dept: FAMILY MEDICINE CLINIC | Age: 26
End: 2021-07-13
Payer: COMMERCIAL

## 2021-07-13 VITALS
WEIGHT: 231 LBS | RESPIRATION RATE: 16 BRPM | OXYGEN SATURATION: 99 % | SYSTOLIC BLOOD PRESSURE: 116 MMHG | DIASTOLIC BLOOD PRESSURE: 80 MMHG | HEIGHT: 68 IN | BODY MASS INDEX: 35.01 KG/M2 | TEMPERATURE: 97.2 F | HEART RATE: 70 BPM

## 2021-07-13 DIAGNOSIS — K21.9 GASTROESOPHAGEAL REFLUX DISEASE, UNSPECIFIED WHETHER ESOPHAGITIS PRESENT: Primary | ICD-10-CM

## 2021-07-13 PROCEDURE — G8427 DOCREV CUR MEDS BY ELIG CLIN: HCPCS | Performed by: NURSE PRACTITIONER

## 2021-07-13 PROCEDURE — G8417 CALC BMI ABV UP PARAM F/U: HCPCS | Performed by: NURSE PRACTITIONER

## 2021-07-13 PROCEDURE — 4004F PT TOBACCO SCREEN RCVD TLK: CPT | Performed by: NURSE PRACTITIONER

## 2021-07-13 PROCEDURE — 99213 OFFICE O/P EST LOW 20 MIN: CPT | Performed by: NURSE PRACTITIONER

## 2021-07-13 RX ORDER — PANTOPRAZOLE SODIUM 40 MG/1
40 TABLET, DELAYED RELEASE ORAL DAILY
Qty: 30 TABLET | Refills: 2 | Status: SHIPPED | OUTPATIENT
Start: 2021-07-13 | End: 2021-10-04 | Stop reason: SDUPTHER

## 2021-07-13 ASSESSMENT — ENCOUNTER SYMPTOMS
EYES NEGATIVE: 1
BLOOD IN STOOL: 0
SORE THROAT: 0
CHOKING: 0
STRIDOR: 0
BELCHING: 0
COUGH: 0
DIARRHEA: 0
CONSTIPATION: 0
ABDOMINAL PAIN: 1
ABDOMINAL DISTENTION: 0
NAUSEA: 1
WHEEZING: 0
RECTAL PAIN: 0
WATER BRASH: 0
ANAL BLEEDING: 0
HOARSE VOICE: 0
VOMITING: 1
GLOBUS SENSATION: 0
COLOR CHANGE: 0
HEARTBURN: 1

## 2021-07-13 NOTE — PROGRESS NOTES
Subjective:     Patient ID: Samanta Goins is a 23 y.o. female who presentstoday for:  Chief Complaint   Patient presents with    Abdominal Pain     Pt. is here with c/o vomitting in the AM's because she works late at night and is eating late. Pt. states she is concerned because she is throwing up her meds in the morning, confusion (trouble remembering things) and is nauseas most of the time. Gastroesophageal Reflux  She complains of abdominal pain, early satiety, heartburn and nausea. She reports no belching, no chest pain, no choking, no coughing, no dysphagia, no globus sensation, no hoarse voice, no sore throat, no stridor, no tooth decay, no water brash or no wheezing. This is a recurrent problem. The current episode started more than 1 month ago. The problem occurs frequently. The problem has been gradually worsening. The heartburn does not wake her from sleep. The symptoms are aggravated by certain foods, lying down and stress. Pertinent negatives include no anemia, fatigue, melena, muscle weakness, orthopnea or weight loss. She has tried a PPI for the symptoms. The treatment provided moderate relief. Past Medical History:   Diagnosis Date    Anxiety     Asthma     Bipolar depression ([facility] [address]     Migraines      Current Outpatient Medications on File Prior to Visit   Medication Sig Dispense Refill    ASMANEX, 30 METERED DOSES, 220 MCG/INH inhaler INHALE 1 PUFF into the lungs DAILY 1 Inhaler 5    cetirizine-psuedoephedrine (ZYRTEC-D) 5-120 MG per extended release tablet TAKE 1 TABLET BY MOUTH TWICE DAILY 60 tablet 2    tiZANidine (ZANAFLEX) 4 MG tablet Take 1 tablet by mouth every 8 hours as needed (spasm) 60 tablet 2    hydrOXYzine (VISTARIL) 25 MG capsule TAKE 1 CAPSULE BY MOUTH FOUR TIMES DAILY AS NEEDED FOR ANXIETY      doxepin (SINEQUAN) 10 MG capsule TAKE 2 CAPSULES BY MOUTH AT BEDTIME. TAKE 1&1/2 HOURS before bedtime.       busPIRone (BUSPAR) 10 MG tablet Take 10 mg by mouth 2 times daily (with meals)      Rimegepant Sulfate (NURTEC) 75 MG TBDP Take 75 mg by mouth daily as needed (migraine) 15 tablet 1    montelukast (SINGULAIR) 10 MG tablet Take 1 tablet by mouth nightly 90 tablet 1    vitamin D (ERGOCALCIFEROL) 1.25 MG (34936 UT) CAPS capsule Take 1 capsule by mouth once a week 12 capsule 1    topiramate (TOPAMAX) 100 MG tablet Take 1 tablet by mouth 2 times daily 60 tablet 3    FEROSUL 325 (65 Fe) MG tablet Take 1 tablet by mouth daily (with breakfast) 90 tablet 1    albuterol sulfate  (90 Base) MCG/ACT inhaler Inhale 2 puffs into the lungs 4 times daily as needed for Wheezing 1 Inhaler 5    naratriptan (AMERGE) 2.5 MG tablet Take 1 tablet by mouth once as needed for Migraine 2.5 mg at onset of headache, may repeat in 4 hours if needed 9 tablet 3    LATUDA 60 MG TABS tablet Take 1 tablet by mouth every evening Take with at least 350 calories.  AIMOVIG 140 MG/ML SOAJ Inject 1 mL into the skin every 30 days      cetirizine (ZYRTEC) 10 MG tablet Take 1 tablet by mouth daily 90 tablet 2    ondansetron (ZOFRAN-ODT) 4 MG disintegrating tablet DISSOLVE 1 (ONE) TABLET BY MOUTH EVERY 8 HOURS       No current facility-administered medications on file prior to visit. No past surgical history on file.      Family History   Problem Relation Age of Onset    Mental Illness Mother     Anxiety Disorder Mother     Schizophrenia Mother     Cancer Father         throat cancer    Allergies Brother      Social History     Socioeconomic History    Marital status: Single     Spouse name: Not on file    Number of children: Not on file    Years of education: Not on file    Highest education level: Not on file   Occupational History    Not on file   Tobacco Use    Smoking status: Current Every Day Smoker     Packs/day: 0.25     Types: Cigarettes     Start date: 2/7/2014    Smokeless tobacco: Never Used   Substance and Sexual Activity    Alcohol use: Never    Drug use: Never    Sexual activity: Yes     Partners: Male   Other Topics Concern    Not on file   Social History Narrative    Not on file     Social Determinants of Health     Financial Resource Strain: Low Risk     Difficulty of Paying Living Expenses: Not hard at all   Food Insecurity: No Food Insecurity    Worried About Running Out of Food in the Last Year: Never true    [address] in the Last Year: Never true   Transportation Needs: No Transportation Needs    Lack of Transportation (Medical): No    Lack of Transportation (Non-Medical): No   Physical Activity:     Days of Exercise per Week:     Minutes of Exercise per Session:    Stress:     Feeling of Stress :    Social Connections:     Frequency of Communication with Friends and Family:     Frequency of Social Gatherings with Friends and Family:     Attends L[employer] day Services:     Active Member of Clubs or Organizations:     Attends Club or Organization Meetings:     Marital Status:    Intimate Partner Violence:     Fear of Current or Ex-Partner:     Emotionally Abused:     Physically Abused:     Sexually Abused: Allergies:  Aspirin and Vicodin [hydrocodone-acetaminophen]    Review of Systems   Constitutional: Negative for chills, diaphoresis, fatigue, fever and weight loss. HENT: Negative. Negative for hoarse voice and sore throat. Eyes: Negative. Respiratory: Negative for cough, choking and wheezing. Cardiovascular: Negative for chest pain and palpitations. Gastrointestinal: Positive for abdominal pain, heartburn, nausea and vomiting. Negative for abdominal distention, anal bleeding, blood in stool, constipation, diarrhea, dysphagia, melena and rectal pain. Genitourinary: Negative. Musculoskeletal: Negative. Negative for muscle weakness. Skin: Negative for color change and wound. Neurological: Negative for dizziness, syncope, weakness, light-headedness and headaches. Psychiatric/Behavioral: Negative.         Objective: Statement Selected /80 (Site: Left Upper Arm, Position: Sitting, Cuff Size: Medium Adult)   Pulse 70   Temp 97.2 °F (36.2 °C) (Temporal)   Resp 16   Ht 5' 8\" (1.727 m)   Wt 231 lb (104.8 kg)   LMP  (Approximate)   SpO2 99%   Breastfeeding No   BMI 35.12 kg/m²     Physical Exam  Constitutional:       General: She is not in acute distress. Cardiovascular:      Rate and Rhythm: Normal rate. Pulmonary:      Effort: Pulmonary effort is normal.   Abdominal:      General: Bowel sounds are normal. There is no distension. Palpations: Abdomen is soft. Tenderness: There is generalized abdominal tenderness. There is no guarding or rebound. Negative signs include Carvalho's sign and McBurney's sign. Neurological:      Mental Status: She is alert and oriented to person, place, and time. Psychiatric:         Mood and Affect: Mood normal.         Behavior: Behavior normal.         Assessment & Plan:       Diagnosis Orders   1. Gastroesophageal reflux disease, unspecified whether esophagitis present  pantoprazole (PROTONIX) 40 MG tablet     No orders of the defined types were placed in this encounter. Orders Placed This Encounter   Medications    pantoprazole (PROTONIX) 40 MG tablet     Sig: Take 1 tablet by mouth daily     Dispense:  30 tablet     Refill:  2     Medications Discontinued During This Encounter   Medication Reason    omeprazole (PRILOSEC) 20 MG delayed release capsule LIST CLEANUP     Return in about 2 weeks (around 7/27/2021). Reviewed with the patient: currentclinical status, medications, activities and diet. Side effects, adverse effects of the medicationprescribed today, as well as treatment plan/ rationale and result expectations havebeen discussed with the patient who expresses understanding and desires to proceed. Pt instructions reviewed and given to patient.     Close follow up to evaluate treatment resultsand for coordination of care.   I have reviewed the patient's medical historyin

## 2021-08-09 DIAGNOSIS — G43.719 INTRACTABLE CHRONIC MIGRAINE WITHOUT AURA AND WITHOUT STATUS MIGRAINOSUS: ICD-10-CM

## 2021-08-09 DIAGNOSIS — M62.830 MUSCLE SPASM OF BACK: ICD-10-CM

## 2021-08-09 DIAGNOSIS — J45.30 MILD PERSISTENT ASTHMA WITHOUT COMPLICATION: ICD-10-CM

## 2021-08-09 DIAGNOSIS — G43.009 MIGRAINE WITHOUT AURA AND WITHOUT STATUS MIGRAINOSUS, NOT INTRACTABLE: ICD-10-CM

## 2021-08-13 RX ORDER — TOPIRAMATE 100 MG/1
100 TABLET, FILM COATED ORAL 2 TIMES DAILY
Qty: 60 TABLET | Refills: 3 | Status: SHIPPED | OUTPATIENT
Start: 2021-08-13 | End: 2021-12-09

## 2021-08-13 RX ORDER — ALBUTEROL SULFATE 90 UG/1
AEROSOL, METERED RESPIRATORY (INHALATION)
Qty: 1 INHALER | Refills: 5 | Status: SHIPPED | OUTPATIENT
Start: 2021-08-13 | End: 2022-02-08

## 2021-08-13 RX ORDER — TIZANIDINE 4 MG/1
4 TABLET ORAL EVERY 8 HOURS PRN
Qty: 60 TABLET | Refills: 2 | Status: SHIPPED | OUTPATIENT
Start: 2021-08-13 | End: 2021-11-08

## 2021-08-13 RX ORDER — RIMEGEPANT SULFATE 75 MG/75MG
TABLET, ORALLY DISINTEGRATING ORAL
Qty: 15 TABLET | Refills: 1 | Status: SHIPPED | OUTPATIENT
Start: 2021-08-13

## 2021-09-09 RX ORDER — CETIRIZINE HYDROCHLORIDE, PSEUDOEPHEDRINE HYDROCHLORIDE 5; 120 MG/1; MG/1
TABLET, FILM COATED, EXTENDED RELEASE ORAL
Qty: 60 TABLET | Refills: 2 | Status: SHIPPED | OUTPATIENT
Start: 2021-09-09 | End: 2021-12-09

## 2021-10-04 ENCOUNTER — OFFICE VISIT (OUTPATIENT)
Dept: FAMILY MEDICINE CLINIC | Age: 26
End: 2021-10-04
Payer: COMMERCIAL

## 2021-10-04 VITALS
WEIGHT: 215 LBS | HEART RATE: 69 BPM | SYSTOLIC BLOOD PRESSURE: 122 MMHG | HEIGHT: 68 IN | RESPIRATION RATE: 16 BRPM | DIASTOLIC BLOOD PRESSURE: 88 MMHG | TEMPERATURE: 98.3 F | BODY MASS INDEX: 32.58 KG/M2 | OXYGEN SATURATION: 99 %

## 2021-10-04 DIAGNOSIS — S39.012A STRAIN OF LUMBAR REGION, INITIAL ENCOUNTER: Primary | ICD-10-CM

## 2021-10-04 PROCEDURE — 99213 OFFICE O/P EST LOW 20 MIN: CPT | Performed by: NURSE PRACTITIONER

## 2021-10-04 PROCEDURE — G8427 DOCREV CUR MEDS BY ELIG CLIN: HCPCS | Performed by: NURSE PRACTITIONER

## 2021-10-04 PROCEDURE — 1036F TOBACCO NON-USER: CPT | Performed by: NURSE PRACTITIONER

## 2021-10-04 PROCEDURE — G8484 FLU IMMUNIZE NO ADMIN: HCPCS | Performed by: NURSE PRACTITIONER

## 2021-10-04 PROCEDURE — G8417 CALC BMI ABV UP PARAM F/U: HCPCS | Performed by: NURSE PRACTITIONER

## 2021-10-04 RX ORDER — METHYLPREDNISOLONE 4 MG/1
TABLET ORAL
Qty: 1 KIT | Refills: 0 | Status: SHIPPED | OUTPATIENT
Start: 2021-10-04

## 2021-10-04 RX ORDER — TRIAMCINOLONE ACETONIDE 1 MG/G
CREAM TOPICAL
Qty: 45 G | Refills: 1 | Status: SHIPPED | OUTPATIENT
Start: 2021-10-04

## 2021-10-04 RX ORDER — PANTOPRAZOLE SODIUM 40 MG/1
40 TABLET, DELAYED RELEASE ORAL DAILY
Qty: 30 TABLET | Refills: 2 | Status: SHIPPED | OUTPATIENT
Start: 2021-10-04 | End: 2022-02-08

## 2021-10-04 RX ORDER — PANTOPRAZOLE SODIUM 40 MG/1
40 TABLET, DELAYED RELEASE ORAL DAILY
Qty: 30 TABLET | Refills: 2 | Status: CANCELLED | OUTPATIENT
Start: 2021-10-04

## 2021-10-04 ASSESSMENT — ENCOUNTER SYMPTOMS
BOWEL INCONTINENCE: 0
ABDOMINAL PAIN: 0
BACK PAIN: 1

## 2021-10-04 NOTE — PROGRESS NOTES
Subjective:     Patient ID: Dyana Hodgson is a 23 y.o. female who presentstoday for:  Chief Complaint   Patient presents with    Back Pain     Pt. c/o bruises on her back that are traveling towards her hip. Pt. states her whole tailbone has been hurting for 1 1/2 months-2 months. Back Pain  This is a new problem. The current episode started more than 1 month ago. The problem occurs intermittently. The problem has been waxing and waning since onset. The pain is present in the lumbar spine and gluteal. The quality of the pain is described as aching and cramping. The pain is mild. Associated symptoms include leg pain. Pertinent negatives include no abdominal pain, bladder incontinence, bowel incontinence, chest pain, dysuria, fever, headaches, numbness, paresis, paresthesias, pelvic pain, perianal numbness, tingling, weakness or weight loss. She has tried NSAIDs and muscle relaxant for the symptoms. The treatment provided mild relief.        Past Medical History:   Diagnosis Date    Anxiety     Asthma     Bipolar depression ([facility] [address]     Migraines      Current Outpatient Medications on File Prior to Visit   Medication Sig Dispense Refill    cetirizine-psuedoephedrine (ZYRTEC-D) 5-120 MG per extended release tablet TAKE 1 TABLET BY MOUTH TWICE DAILY 60 tablet 2    albuterol sulfate  (90 Base) MCG/ACT inhaler INHALE 2 PUFFS into the lungs FOUR TIMES DAILY AS NEEDED FOR WHEEZING 1 Inhaler 5    tiZANidine (ZANAFLEX) 4 MG tablet Take 1 tablet by mouth every 8 hours as needed (spasm) 60 tablet 2    NURTEC 75 MG TBDP Dissolve 1 tablet in the mouth daily as needed (migraine) 15 tablet 1    topiramate (TOPAMAX) 100 MG tablet Take 1 tablet by mouth 2 times daily 60 tablet 3    ASMANEX, 30 METERED DOSES, 220 MCG/INH inhaler INHALE 1 PUFF into the lungs DAILY 1 Inhaler 5    hydrOXYzine (VISTARIL) 25 MG capsule TAKE 1 CAPSULE BY MOUTH FOUR TIMES DAILY AS NEEDED FOR ANXIETY      doxepin (SINEQUAN) 10 MG capsule TAKE 2 CAPSULES BY MOUTH AT BEDTIME. TAKE 1&1/2 HOURS before bedtime.  busPIRone (BUSPAR) 10 MG tablet Take 10 mg by mouth 2 times daily (with meals)      montelukast (SINGULAIR) 10 MG tablet Take 1 tablet by mouth nightly 90 tablet 1    vitamin D (ERGOCALCIFEROL) 1.25 MG (24474 UT) CAPS capsule Take 1 capsule by mouth once a week 12 capsule 1    FEROSUL 325 (65 Fe) MG tablet Take 1 tablet by mouth daily (with breakfast) 90 tablet 1    naratriptan (AMERGE) 2.5 MG tablet Take 1 tablet by mouth once as needed for Migraine 2.5 mg at onset of headache, may repeat in 4 hours if needed 9 tablet 3    LATUDA 60 MG TABS tablet Take 1 tablet by mouth every evening Take with at least 350 calories.  AIMOVIG 140 MG/ML SOAJ Inject 1 mL into the skin every 30 days      cetirizine (ZYRTEC) 10 MG tablet Take 1 tablet by mouth daily 90 tablet 2    ondansetron (ZOFRAN-ODT) 4 MG disintegrating tablet DISSOLVE 1 (ONE) TABLET BY MOUTH EVERY 8 HOURS       No current facility-administered medications on file prior to visit. No past surgical history on file.      Family History   Problem Relation Age of Onset    Mental Illness Mother     Anxiety Disorder Mother     Schizophrenia Mother     Cancer Father         throat cancer    Allergies Brother      Social History     Socioeconomic History    Marital status: Single     Spouse name: Not on file    Number of children: Not on file    Years of education: Not on file    Highest education level: Not on file   Occupational History    Not on file   Tobacco Use    Smoking status: Former Smoker     Packs/day: 0.25     Types: Cigarettes     Start date: 2014     Quit date: 2021     Years since quittin.0    Smokeless tobacco: Never Used   Substance and Sexual Activity    Alcohol use: Never    Drug use: Never    Sexual activity: Yes     Partners: Male   Other Topics Concern    Not on file   Social History Narrative    Not on file     Social Determinants of Health     Financial Resource Strain: Low Risk     Difficulty of Paying Living Expenses: Not hard at all   Food Insecurity: No Food Insecurity    Worried About Running Out of Food in the Last Year: Never true    Mariana of Food in the Last Year: Never true   Transportation Needs: No Transportation Needs    Lack of Transportation (Medical): No    Lack of Transportation (Non-Medical): No   Physical Activity:     Days of Exercise per Week:     Minutes of Exercise per Session:    Stress:     Feeling of Stress :    Social Connections:     Frequency of Communication with Friends and Family:     Frequency of Social Gatherings with Friends and Family:     Attends Anab[employer]ist Services:     Active Member of Clubs or Organizations:     Attends Club or Organization Meetings:     Marital Status:    Intimate Partner Violence:     Fear of Current or Ex-Partner:     Emotionally Abused:     Physically Abused:     Sexually Abused: Allergies:  Aspirin and Vicodin [hydrocodone-acetaminophen]    Review of Systems   Constitutional: Negative. Negative for fever and weight loss. HENT: Negative. Eyes: Negative. Respiratory: Negative. Cardiovascular: Negative. Negative for chest pain. Gastrointestinal: Negative. Negative for abdominal pain and bowel incontinence. Genitourinary: Negative. Negative for bladder incontinence, dysuria and pelvic pain. Musculoskeletal: Positive for back pain. Negative for gait problem. Skin: Negative. Neurological: Negative for tingling, weakness, numbness, headaches and paresthesias. Psychiatric/Behavioral: Negative.         Objective:    /88 (Site: Left Upper Arm, Position: Sitting, Cuff Size: Medium Adult)   Pulse 69   Temp 98.3 °F (36.8 °C) (Oral)   Resp 16   Ht 5' 8\" (1.727 m)   Wt 215 lb (97.5 kg)   LMP 09/09/2021   SpO2 99%   Breastfeeding No   BMI 32.69 kg/m²     Physical Exam  Constitutional:       General: She is not in acute distress. HENT:      Head: Normocephalic and atraumatic. Right Ear: External ear normal.      Left Ear: External ear normal.      Mouth/Throat:      Mouth: Mucous membranes are moist.   Eyes:      Extraocular Movements: Extraocular movements intact. Conjunctiva/sclera: Conjunctivae normal.      Pupils: Pupils are equal, round, and reactive to light. Cardiovascular:      Rate and Rhythm: Normal rate and regular rhythm. Heart sounds: Normal heart sounds. Pulmonary:      Effort: Pulmonary effort is normal. No respiratory distress. Breath sounds: Normal breath sounds. Abdominal:      Tenderness: There is no abdominal tenderness. Musculoskeletal:      Cervical back: Neck supple. Lumbar back: Spasms and tenderness present. No bony tenderness. Normal range of motion. Skin:     General: Skin is warm and dry. Neurological:      Mental Status: She is alert and oriented to person, place, and time. Cranial Nerves: No cranial nerve deficit. Motor: No weakness. Gait: Gait normal.   Psychiatric:         Mood and Affect: Mood normal.         Behavior: Behavior normal.         Assessment & Plan:       Diagnosis Orders   1. Strain of lumbar region, initial encounter  methylPREDNISolone (MEDROL, JEZ,) 4 MG tablet    diclofenac sodium (VOLTAREN) 1 % GEL     No orders of the defined types were placed in this encounter. Orders Placed This Encounter   Medications    pantoprazole (PROTONIX) 40 MG tablet     Sig: Take 1 tablet by mouth daily     Dispense:  30 tablet     Refill:  2    methylPREDNISolone (MEDROL, JEZ,) 4 MG tablet     Sig: Take by mouth. Dispense:  1 kit     Refill:  0    diclofenac sodium (VOLTAREN) 1 % GEL     Sig: Apply 2 g topically 4 times daily     Dispense:  100 g     Refill:  0    triamcinolone (KENALOG) 0.1 % cream     Sig: Apply topically 2 times daily.      Dispense:  45 g     Refill:  1     Medications Discontinued During This Encounter   Medication Reason    pantoprazole (PROTONIX) 40 MG tablet REORDER     Return if symptoms worsen or fail to improve. Reviewed with the patient: currentclinical status, medications, activities and diet. Side effects, adverse effects of the medicationprescribed today, as well as treatment plan/ rationale and result expectations havebeen discussed with the patient who expresses understanding and desires to proceed. Pt instructions reviewed and given to patient.     Close follow up to evaluate treatment resultsand for coordination of care. I have reviewed the patient's medical historyin detail and updated the computerized patient record.     Vira Vaughan, KENNETH - CNP

## 2021-10-06 ASSESSMENT — ENCOUNTER SYMPTOMS
EYES NEGATIVE: 1
GASTROINTESTINAL NEGATIVE: 1
RESPIRATORY NEGATIVE: 1

## 2021-10-26 RX ORDER — FERROUS SULFATE 325(65) MG
TABLET ORAL
Qty: 90 TABLET | Refills: 1 | Status: SHIPPED | OUTPATIENT
Start: 2021-10-26

## 2021-10-26 RX ORDER — ERGOCALCIFEROL 1.25 MG/1
50000 CAPSULE ORAL WEEKLY
Qty: 12 CAPSULE | Refills: 1 | Status: SHIPPED | OUTPATIENT
Start: 2021-10-26 | End: 2022-04-04

## 2021-10-30 DIAGNOSIS — J30.2 SEASONAL ALLERGIC RHINITIS, UNSPECIFIED TRIGGER: ICD-10-CM

## 2021-11-01 RX ORDER — MONTELUKAST SODIUM 10 MG/1
10 TABLET ORAL NIGHTLY
Qty: 90 TABLET | Refills: 1 | Status: SHIPPED | OUTPATIENT
Start: 2021-11-01 | End: 2022-05-31

## 2021-11-02 DIAGNOSIS — M62.830 MUSCLE SPASM OF BACK: ICD-10-CM

## 2021-11-03 NOTE — TELEPHONE ENCOUNTER
Future Appointments    This patient does not currently have any appointments scheduled.   Recent Visits    10/04/2021 Strain of lumbar region, initial encounter   [facility] and [address] [place] [address], APRN - CNP   07/13/2021 Gastroesophageal reflux disease, unspecified whether esophagitis present   [facility] and [address] [place] [address], KENNETH - CNP

## 2021-11-08 RX ORDER — TIZANIDINE 4 MG/1
4 TABLET ORAL EVERY 8 HOURS PRN
Qty: 60 TABLET | Refills: 2 | Status: SHIPPED | OUTPATIENT
Start: 2021-11-08 | End: 2022-02-08

## 2021-12-09 DIAGNOSIS — G43.009 MIGRAINE WITHOUT AURA AND WITHOUT STATUS MIGRAINOSUS, NOT INTRACTABLE: ICD-10-CM

## 2021-12-09 NOTE — TELEPHONE ENCOUNTER
Future Appointments    This patient does not currently have any appointments scheduled.     Recent Visits    10/04/2021 Strain of lumbar region, initial encounter   [facility] and [address] [place] [address], APRN - CNP   07/13/2021 Gastroesophageal reflux disease, unspecified whether esophagitis present   [facility] and [address] [place] [address], KENNETH - CNP

## 2021-12-10 RX ORDER — CETIRIZINE HYDROCHLORIDE, PSEUDOEPHEDRINE HYDROCHLORIDE 5; 120 MG/1; MG/1
TABLET, FILM COATED, EXTENDED RELEASE ORAL
Qty: 60 TABLET | Refills: 2 | Status: SHIPPED | OUTPATIENT
Start: 2021-12-10 | End: 2022-03-07

## 2021-12-10 RX ORDER — TOPIRAMATE 100 MG/1
100 TABLET, FILM COATED ORAL 2 TIMES DAILY
Qty: 60 TABLET | Refills: 0 | Status: SHIPPED | OUTPATIENT
Start: 2021-12-10 | End: 2022-01-10

## 2022-01-09 DIAGNOSIS — G43.009 MIGRAINE WITHOUT AURA AND WITHOUT STATUS MIGRAINOSUS, NOT INTRACTABLE: ICD-10-CM

## 2022-01-10 RX ORDER — TOPIRAMATE 100 MG/1
100 TABLET, FILM COATED ORAL 2 TIMES DAILY
Qty: 60 TABLET | Refills: 5 | Status: SHIPPED | OUTPATIENT
Start: 2022-01-10 | End: 2022-07-13

## 2022-02-07 DIAGNOSIS — M62.830 MUSCLE SPASM OF BACK: ICD-10-CM

## 2022-02-07 DIAGNOSIS — J45.30 MILD PERSISTENT ASTHMA WITHOUT COMPLICATION: ICD-10-CM

## 2022-02-08 RX ORDER — PANTOPRAZOLE SODIUM 40 MG/1
40 TABLET, DELAYED RELEASE ORAL DAILY
Qty: 30 TABLET | Refills: 2 | Status: SHIPPED | OUTPATIENT
Start: 2022-02-08 | End: 2022-05-04

## 2022-02-08 RX ORDER — TIZANIDINE 4 MG/1
4 TABLET ORAL EVERY 8 HOURS PRN
Qty: 60 TABLET | Refills: 2 | Status: SHIPPED | OUTPATIENT
Start: 2022-02-08 | End: 2022-05-04

## 2022-02-08 NOTE — TELEPHONE ENCOUNTER
Future Appointments    This patient does not currently have any appointments scheduled.     Recent Visits    10/04/2021 Strain of lumbar region, initial encounter   [facility] and [address] [place] [address], APRN - CNP

## 2022-03-07 RX ORDER — CETIRIZINE HYDROCHLORIDE, PSEUDOEPHEDRINE HYDROCHLORIDE 5; 120 MG/1; MG/1
TABLET, FILM COATED, EXTENDED RELEASE ORAL
Qty: 60 TABLET | Refills: 2 | Status: SHIPPED | OUTPATIENT
Start: 2022-03-07 | End: 2022-06-24

## 2022-04-04 RX ORDER — ERGOCALCIFEROL 1.25 MG/1
50000 CAPSULE ORAL WEEKLY
Qty: 12 CAPSULE | Refills: 1 | Status: SHIPPED | OUTPATIENT
Start: 2022-04-04

## 2022-04-04 NOTE — TELEPHONE ENCOUNTER
Future Appointments    This patient does not currently have any appointments scheduled.     Past Visits    Date Provider Specialty Visit Type Primary Dx   10/04/2021 KENNETH Sheikh - CNP Family Medicine Office Visit Strain of lumbar region, initial encounter

## 2022-05-03 DIAGNOSIS — M62.830 MUSCLE SPASM OF BACK: ICD-10-CM

## 2022-05-03 DIAGNOSIS — J45.30 MILD PERSISTENT ASTHMA WITHOUT COMPLICATION: ICD-10-CM

## 2022-05-03 NOTE — TELEPHONE ENCOUNTER
Rx requested:  Requested Prescriptions     Pending Prescriptions Disp Refills    tiZANidine (ZANAFLEX) 4 MG tablet [Pharmacy Med Name: tizanidine 4 mg tablet] 60 tablet 2     Sig: TAKE 1 TABLET BY MOUTH EVERY 8 HOURS AS NEEDED (spasm)    pantoprazole (PROTONIX) 40 MG tablet [Pharmacy Med Name: pantoprazole 40 mg tablet,delayed release] 30 tablet 2     Sig: TAKE 1 TABLET BY MOUTH DAILY    PROAIR  (90 Base) MCG/ACT inhaler [Pharmacy Med Name: Immanuel Lee 90 mcg/actuation aerosol inhaler] 8.5 g 2     Sig: INHALE 2 PUFFS into the lungs FOUR TIMES DAILY AS NEEDED FOR WHEEZING         Last Office Visit:   10/4/2021      Next Visit Date:  No future appointments.

## 2022-05-04 RX ORDER — PANTOPRAZOLE SODIUM 40 MG/1
40 TABLET, DELAYED RELEASE ORAL DAILY
Qty: 30 TABLET | Refills: 2 | Status: SHIPPED | OUTPATIENT
Start: 2022-05-04 | End: 2022-08-16

## 2022-05-04 RX ORDER — TIZANIDINE 4 MG/1
4 TABLET ORAL EVERY 8 HOURS PRN
Qty: 60 TABLET | Refills: 2 | Status: SHIPPED | OUTPATIENT
Start: 2022-05-04 | End: 2022-08-16

## 2022-05-27 DIAGNOSIS — J30.2 SEASONAL ALLERGIC RHINITIS, UNSPECIFIED TRIGGER: ICD-10-CM

## 2022-05-27 NOTE — TELEPHONE ENCOUNTER
Rx requested:  Requested Prescriptions     Pending Prescriptions Disp Refills    montelukast (SINGULAIR) 10 MG tablet [Pharmacy Med Name: montelukast 10 mg tablet] 90 tablet 1     Sig: Take 1 tablet by mouth nightly         Last Office Visit:   10/4/2021      Next Visit Date:  No future appointments.

## 2022-05-31 RX ORDER — MONTELUKAST SODIUM 10 MG/1
10 TABLET ORAL NIGHTLY
Qty: 90 TABLET | Refills: 1 | Status: SHIPPED | OUTPATIENT
Start: 2022-05-31

## 2022-06-01 RX ORDER — CETIRIZINE HYDROCHLORIDE, PSEUDOEPHEDRINE HYDROCHLORIDE 5; 120 MG/1; MG/1
TABLET, FILM COATED, EXTENDED RELEASE ORAL
Qty: 60 TABLET | Refills: 5 | OUTPATIENT
Start: 2022-06-01

## 2022-06-01 RX ORDER — BUSPIRONE HYDROCHLORIDE 10 MG/1
TABLET ORAL
Qty: 60 TABLET | Refills: 5 | OUTPATIENT
Start: 2022-06-01

## 2022-06-24 RX ORDER — CETIRIZINE HYDROCHLORIDE, PSEUDOEPHEDRINE HYDROCHLORIDE 5; 120 MG/1; MG/1
TABLET, FILM COATED, EXTENDED RELEASE ORAL
Qty: 60 TABLET | Refills: 2 | Status: SHIPPED | OUTPATIENT
Start: 2022-06-24

## 2022-06-24 RX ORDER — BUSPIRONE HYDROCHLORIDE 10 MG/1
TABLET ORAL
Qty: 60 TABLET | Refills: 5 | Status: SHIPPED | OUTPATIENT
Start: 2022-06-24

## 2022-07-12 DIAGNOSIS — G43.009 MIGRAINE WITHOUT AURA AND WITHOUT STATUS MIGRAINOSUS, NOT INTRACTABLE: ICD-10-CM

## 2022-07-12 NOTE — TELEPHONE ENCOUNTER
requesting medication refill. Please approve or deny this request.    Rx requested:  Requested Prescriptions     Pending Prescriptions Disp Refills    topiramate (TOPAMAX) 100 MG tablet [Pharmacy Med Name: topiramate 100 mg tablet] 60 tablet 5     Sig: Take 1 tablet by mouth 2 times daily         Last Office Visit:   10/4/2021      Next Visit Date:  No future appointments.

## 2022-07-13 RX ORDER — TOPIRAMATE 100 MG/1
100 TABLET, FILM COATED ORAL 2 TIMES DAILY
Qty: 60 TABLET | Refills: 0 | Status: SHIPPED | OUTPATIENT
Start: 2022-07-13 | End: 2022-08-12

## 2022-08-12 DIAGNOSIS — J45.30 MILD PERSISTENT ASTHMA WITHOUT COMPLICATION: ICD-10-CM

## 2022-08-12 DIAGNOSIS — G43.009 MIGRAINE WITHOUT AURA AND WITHOUT STATUS MIGRAINOSUS, NOT INTRACTABLE: ICD-10-CM

## 2022-08-12 DIAGNOSIS — M62.830 MUSCLE SPASM OF BACK: ICD-10-CM

## 2022-08-12 RX ORDER — TOPIRAMATE 100 MG/1
TABLET, FILM COATED ORAL
Qty: 60 TABLET | Refills: 0 | Status: SHIPPED | OUTPATIENT
Start: 2022-08-12

## 2022-08-12 NOTE — TELEPHONE ENCOUNTER
Rx requested:  Requested Prescriptions     Pending Prescriptions Disp Refills    topiramate (TOPAMAX) 100 MG tablet [Pharmacy Med Name: topiramate 100 mg tablet] 60 tablet 0     Sig: TAKE 1 TABLET BY MOUTH TWICE DAILY         Last Office Visit:   10/04/2021      Next Visit Date:  No future appointments.

## 2022-08-13 NOTE — TELEPHONE ENCOUNTER
requesting medication refill. Please approve or deny this request.    Rx requested:  Requested Prescriptions     Pending Prescriptions Disp Refills    PROAIR  (90 Base) MCG/ACT inhaler [Pharmacy Med Name: Dillon Allie 90 mcg/actuation aerosol inhaler] 8.5 g 2     Sig: INHALE 2 PUFFS into the lungs FOUR TIMES DAILY AS NEEDED FOR WHEEZING    pantoprazole (PROTONIX) 40 MG tablet [Pharmacy Med Name: pantoprazole 40 mg tablet,delayed release] 30 tablet 2     Sig: TAKE 1 TABLET BY MOUTH DAILY    tiZANidine (ZANAFLEX) 4 MG tablet [Pharmacy Med Name: tizanidine 4 mg tablet] 60 tablet 2     Sig: TAKE 1 TABLET BY MOUTH EVERY 8 HOURS AS NEEDED for spasms         Last Office Visit:   10/4/2021      Next Visit Date:  No future appointments.

## 2022-08-16 RX ORDER — PANTOPRAZOLE SODIUM 40 MG/1
40 TABLET, DELAYED RELEASE ORAL DAILY
Qty: 30 TABLET | Refills: 2 | Status: SHIPPED | OUTPATIENT
Start: 2022-08-16

## 2022-08-16 RX ORDER — TIZANIDINE 4 MG/1
TABLET ORAL
Qty: 60 TABLET | Refills: 2 | Status: SHIPPED | OUTPATIENT
Start: 2022-08-16

## 2022-09-20 DIAGNOSIS — G43.009 MIGRAINE WITHOUT AURA AND WITHOUT STATUS MIGRAINOSUS, NOT INTRACTABLE: ICD-10-CM

## 2022-09-21 RX ORDER — TOPIRAMATE 100 MG/1
TABLET, FILM COATED ORAL
Qty: 60 TABLET | Refills: 2 | OUTPATIENT
Start: 2022-09-21

## 2022-09-21 RX ORDER — CETIRIZINE HYDROCHLORIDE, PSEUDOEPHEDRINE HYDROCHLORIDE 5; 120 MG/1; MG/1
TABLET, FILM COATED, EXTENDED RELEASE ORAL
Qty: 60 TABLET | Refills: 2 | OUTPATIENT
Start: 2022-09-21

## 2022-10-15 DIAGNOSIS — G43.009 MIGRAINE WITHOUT AURA AND WITHOUT STATUS MIGRAINOSUS, NOT INTRACTABLE: ICD-10-CM

## 2022-10-17 RX ORDER — TOPIRAMATE 100 MG/1
TABLET, FILM COATED ORAL
Qty: 60 TABLET | Refills: 0 | OUTPATIENT
Start: 2022-10-17

## 2022-10-17 RX ORDER — CETIRIZINE HYDROCHLORIDE, PSEUDOEPHEDRINE HYDROCHLORIDE 5; 120 MG/1; MG/1
TABLET, FILM COATED, EXTENDED RELEASE ORAL
Qty: 60 TABLET | Refills: 0 | OUTPATIENT
Start: 2022-10-17

## 2022-10-18 RX ORDER — ERGOCALCIFEROL 1.25 MG/1
50000 CAPSULE ORAL WEEKLY
Qty: 12 CAPSULE | Refills: 1 | OUTPATIENT
Start: 2022-10-18

## 2022-11-12 RX ORDER — ERGOCALCIFEROL 1.25 MG/1
50000 CAPSULE ORAL WEEKLY
Qty: 12 CAPSULE | Refills: 1 | OUTPATIENT
Start: 2022-11-12

## 2022-11-20 DIAGNOSIS — J45.30 MILD PERSISTENT ASTHMA WITHOUT COMPLICATION: ICD-10-CM

## 2022-11-20 DIAGNOSIS — M62.830 MUSCLE SPASM OF BACK: ICD-10-CM

## 2022-11-21 RX ORDER — TIZANIDINE 4 MG/1
TABLET ORAL
Qty: 60 TABLET | Refills: 2 | OUTPATIENT
Start: 2022-11-21

## 2022-11-21 RX ORDER — PANTOPRAZOLE SODIUM 40 MG/1
40 TABLET, DELAYED RELEASE ORAL DAILY
Qty: 30 TABLET | Refills: 2 | OUTPATIENT
Start: 2022-11-21

## 2022-11-24 ENCOUNTER — HOSPITAL ENCOUNTER (EMERGENCY)
Age: 27
Discharge: LEFT BEFORE DISCHARGE | End: 2022-11-24
Payer: COMMERCIAL

## 2022-11-24 VITALS
HEART RATE: 111 BPM | TEMPERATURE: 99 F | DIASTOLIC BLOOD PRESSURE: 77 MMHG | OXYGEN SATURATION: 95 % | RESPIRATION RATE: 20 BRPM | SYSTOLIC BLOOD PRESSURE: 114 MMHG

## 2022-11-24 DIAGNOSIS — M53.3 PAIN IN THE COCCYX: ICD-10-CM

## 2022-11-24 DIAGNOSIS — R10.84 GENERALIZED ABDOMINAL PAIN: Primary | ICD-10-CM

## 2022-11-24 LAB
ALBUMIN SERPL-MCNC: 3.8 G/DL (ref 3.5–4.6)
ALP BLD-CCNC: 68 U/L (ref 40–130)
ALT SERPL-CCNC: 13 U/L (ref 0–33)
ANION GAP SERPL CALCULATED.3IONS-SCNC: 12 MEQ/L (ref 9–15)
AST SERPL-CCNC: 10 U/L (ref 0–35)
BACTERIA: ABNORMAL /HPF
BASOPHILS ABSOLUTE: 0 K/UL (ref 0–0.2)
BASOPHILS RELATIVE PERCENT: 0.2 %
BILIRUB SERPL-MCNC: 0.6 MG/DL (ref 0.2–0.7)
BILIRUBIN URINE: NEGATIVE
BLOOD, URINE: ABNORMAL
BUN BLDV-MCNC: 6 MG/DL (ref 6–20)
CALCIUM SERPL-MCNC: 8.9 MG/DL (ref 8.5–9.9)
CHLORIDE BLD-SCNC: 99 MEQ/L (ref 95–107)
CLARITY: ABNORMAL
CO2: 22 MEQ/L (ref 20–31)
COLOR: ABNORMAL
CREAT SERPL-MCNC: 0.6 MG/DL (ref 0.5–0.9)
EOSINOPHILS ABSOLUTE: 0 K/UL (ref 0–0.7)
EOSINOPHILS RELATIVE PERCENT: 0.1 %
EPITHELIAL CELLS, UA: ABNORMAL /HPF (ref 0–5)
GFR SERPL CREATININE-BSD FRML MDRD: >60 ML/MIN/{1.73_M2}
GLOBULIN: 3.3 G/DL (ref 2.3–3.5)
GLUCOSE BLD-MCNC: 113 MG/DL (ref 70–99)
GLUCOSE URINE: NEGATIVE MG/DL
HCG, URINE, POC: NEGATIVE
HCT VFR BLD CALC: 41.8 % (ref 37–47)
HEMOGLOBIN: 13.9 G/DL (ref 12–16)
HYALINE CASTS: ABNORMAL /HPF (ref 0–5)
KETONES, URINE: ABNORMAL MG/DL
LACTIC ACID: 1.1 MMOL/L (ref 0.5–2.2)
LEUKOCYTE ESTERASE, URINE: ABNORMAL
LYMPHOCYTES ABSOLUTE: 1.9 K/UL (ref 1–4.8)
LYMPHOCYTES RELATIVE PERCENT: 9.9 %
Lab: NORMAL
MCH RBC QN AUTO: 27.9 PG (ref 27–31.3)
MCHC RBC AUTO-ENTMCNC: 33.2 % (ref 33–37)
MCV RBC AUTO: 83.9 FL (ref 79.4–94.8)
MONOCYTES ABSOLUTE: 0.9 K/UL (ref 0.2–0.8)
MONOCYTES RELATIVE PERCENT: 4.7 %
NEGATIVE QC PASS/FAIL: NORMAL
NEUTROPHILS ABSOLUTE: 16.4 K/UL (ref 1.4–6.5)
NEUTROPHILS RELATIVE PERCENT: 85.1 %
NITRITE, URINE: NEGATIVE
PDW BLD-RTO: 14.6 % (ref 11.5–14.5)
PH UA: 5.5 (ref 5–9)
PLATELET # BLD: 235 K/UL (ref 130–400)
POSITIVE QC PASS/FAIL: NORMAL
POTASSIUM SERPL-SCNC: 3.3 MEQ/L (ref 3.4–4.9)
PROTEIN UA: ABNORMAL MG/DL
RBC # BLD: 4.98 M/UL (ref 4.2–5.4)
RBC UA: ABNORMAL /HPF (ref 0–5)
SODIUM BLD-SCNC: 133 MEQ/L (ref 135–144)
SPECIFIC GRAVITY UA: 1.02 (ref 1–1.03)
TOTAL PROTEIN: 7.1 G/DL (ref 6.3–8)
URINE REFLEX TO CULTURE: YES
UROBILINOGEN, URINE: 1 E.U./DL
WBC # BLD: 19.2 K/UL (ref 4.8–10.8)
WBC UA: ABNORMAL /HPF (ref 0–5)

## 2022-11-24 PROCEDURE — 85025 COMPLETE CBC W/AUTO DIFF WBC: CPT

## 2022-11-24 PROCEDURE — 99283 EMERGENCY DEPT VISIT LOW MDM: CPT

## 2022-11-24 PROCEDURE — 87086 URINE CULTURE/COLONY COUNT: CPT

## 2022-11-24 PROCEDURE — 80053 COMPREHEN METABOLIC PANEL: CPT

## 2022-11-24 PROCEDURE — 36415 COLL VENOUS BLD VENIPUNCTURE: CPT

## 2022-11-24 PROCEDURE — 81001 URINALYSIS AUTO W/SCOPE: CPT

## 2022-11-24 PROCEDURE — 83605 ASSAY OF LACTIC ACID: CPT

## 2022-11-24 RX ORDER — MORPHINE SULFATE 4 MG/ML
4 INJECTION, SOLUTION INTRAMUSCULAR; INTRAVENOUS ONCE
Status: DISCONTINUED | OUTPATIENT
Start: 2022-11-24 | End: 2022-11-24

## 2022-11-24 RX ORDER — 0.9 % SODIUM CHLORIDE 0.9 %
1000 INTRAVENOUS SOLUTION INTRAVENOUS ONCE
Status: DISCONTINUED | OUTPATIENT
Start: 2022-11-24 | End: 2022-11-24

## 2022-11-24 RX ORDER — ONDANSETRON 2 MG/ML
4 INJECTION INTRAMUSCULAR; INTRAVENOUS ONCE
Status: DISCONTINUED | OUTPATIENT
Start: 2022-11-24 | End: 2022-11-24

## 2022-11-24 RX ORDER — KETOROLAC TROMETHAMINE 30 MG/ML
30 INJECTION, SOLUTION INTRAMUSCULAR; INTRAVENOUS ONCE
Status: DISCONTINUED | OUTPATIENT
Start: 2022-11-24 | End: 2022-11-24

## 2022-11-24 ASSESSMENT — PAIN - FUNCTIONAL ASSESSMENT: PAIN_FUNCTIONAL_ASSESSMENT: 0-10

## 2022-11-24 ASSESSMENT — ENCOUNTER SYMPTOMS
ABDOMINAL PAIN: 1
EYE PAIN: 0
SHORTNESS OF BREATH: 0
COLOR CHANGE: 0
BACK PAIN: 1
ALLERGIC/IMMUNOLOGIC NEGATIVE: 1
APNEA: 0
TROUBLE SWALLOWING: 0

## 2022-11-24 ASSESSMENT — PAIN DESCRIPTION - LOCATION: LOCATION: ABDOMEN;COCCYX

## 2022-11-24 ASSESSMENT — PAIN SCALES - GENERAL: PAINLEVEL_OUTOF10: 10

## 2022-11-24 ASSESSMENT — PAIN DESCRIPTION - PAIN TYPE: TYPE: ACUTE PAIN

## 2022-11-24 NOTE — ED PROVIDER NOTES
[address] [facility] ED  eMERGENCYdEPARTMENT eNCOUnter      Pt Name: Lorena Humphrey  MRN: [id number]  [place] 1995  Date of evaluation: 11/24/2022  Provider:Home Butterfield PA-C    CHIEF COMPLAINT       Chief Complaint   Patient presents with    Tailbone Pain     Since last night, no injury    Abdominal Pain         HISTORY OF PRESENT ILLNESS  (Location/Symptom, Timing/Onset, Context/Setting, Quality, Duration, Modifying Factors, Severity.)   Lorena Humphrey is a 33 y.o. female who presents to the emergency department with complaints of pain to the lower back and tailbone region as well as lower abdomen, onset last night. Patient denies any inciting injuries. Patient describes it as a sharp, pressure sensation. She denies any nausea or vomiting. Last bowel movement was 2 days ago and normal at that time. Patient denies any hematuria or dysuria. Nothing taken for the symptoms    HPI    Nursing Notes were reviewed and I agree. REVIEW OF SYSTEMS    (2-9 systems for level 4, 10 or more for level 5)     Review of Systems   Constitutional:  Negative for diaphoresis and fever. HENT:  Negative for hearing loss and trouble swallowing. Eyes:  Negative for pain. Respiratory:  Negative for apnea and shortness of breath. Cardiovascular:  Negative for chest pain. Gastrointestinal:  Positive for abdominal pain. Endocrine: Negative. Genitourinary:  Negative for hematuria. Musculoskeletal:  Positive for back pain. Negative for neck pain and neck stiffness. Skin:  Negative for color change. Allergic/Immunologic: Negative. Neurological:  Negative for dizziness and numbness. Hematological: Negative. Psychiatric/Behavioral: Negative. All other systems reviewed and are negative. Except as noted above the remainder of the review of systems was reviewed and negative.        PAST MEDICAL HISTORY     Past Medical History:   Diagnosis Date    Anxiety     Asthma     Bipolar depression ([facility]     Migraines          SURGICAL HISTORY     History reviewed. No pertinent surgical history. CURRENT MEDICATIONS       Previous Medications    AIMOVIG 140 MG/ML SOAJ    Inject 1 mL into the skin every 30 days    ASMANEX, 30 METERED DOSES, 220 MCG/INH INHALER    INHALE 1 PUFF into the lungs DAILY    BUSPIRONE (BUSPAR) 10 MG TABLET    Take 1 tablet by mouth twice a day with meals    CETIRIZINE (ZYRTEC) 10 MG TABLET    Take 1 tablet by mouth daily    CETIRIZINE-PSUEDOEPHEDRINE (ZYRTEC-D) 5-120 MG PER EXTENDED RELEASE TABLET    TAKE 1 TABLET BY MOUTH TWICE DAILY    DICLOFENAC SODIUM (VOLTAREN) 1 % GEL    Apply 2 g topically 4 times daily    DOXEPIN (SINEQUAN) 10 MG CAPSULE    TAKE 2 CAPSULES BY MOUTH AT BEDTIME. TAKE 1&1/2 HOURS before bedtime. FEROSUL 325 (65 FE) MG TABLET    Take 1 tablet by mouth daily (with breakfast)    HYDROXYZINE (VISTARIL) 25 MG CAPSULE    TAKE 1 CAPSULE BY MOUTH FOUR TIMES DAILY AS NEEDED FOR ANXIETY    LATUDA 60 MG TABS TABLET    Take 1 tablet by mouth every evening Take with at least 350 calories. METHYLPREDNISOLONE (MEDROL, JEZ,) 4 MG TABLET    Take by mouth.     MONTELUKAST (SINGULAIR) 10 MG TABLET    Take 1 tablet by mouth nightly    NARATRIPTAN (AMERGE) 2.5 MG TABLET    Take 1 tablet by mouth once as needed for Migraine 2.5 mg at onset of headache, may repeat in 4 hours if needed    NURTEC 75 MG TBDP    Dissolve 1 tablet in the mouth daily as needed (migraine)    ONDANSETRON (ZOFRAN-ODT) 4 MG DISINTEGRATING TABLET    DISSOLVE 1 (ONE) TABLET BY MOUTH EVERY 8 HOURS    PANTOPRAZOLE (PROTONIX) 40 MG TABLET    TAKE 1 TABLET BY MOUTH DAILY    PROAIR  (90 BASE) MCG/ACT INHALER    INHALE 2 PUFFS into the lungs FOUR TIMES DAILY AS NEEDED FOR WHEEZING    TIZANIDINE (ZANAFLEX) 4 MG TABLET    TAKE 1 TABLET BY MOUTH EVERY 8 HOURS AS NEEDED for spasms    TOPIRAMATE (TOPAMAX) 100 MG TABLET    TAKE 1 TABLET BY MOUTH TWICE DAILY    TRIAMCINOLONE (KENALOG) 0.1 % CREAM    Apply topically 2 times daily. VITAMIN D (ERGOCALCIFEROL) 1.25 MG (22650 UT) CAPS CAPSULE    TAKE 1 CAPSULE BY MOUTH ONCE A WEEK       ALLERGIES     Aspirin and Vicodin [hydrocodone-acetaminophen]    FAMILY HISTORY       Family History   Problem Relation Age of Onset    Mental Illness Mother     Anxiety Disorder Mother     Schizophrenia Mother     Cancer Father         throat cancer    Allergies Brother           SOCIAL HISTORY       Social History     Socioeconomic History    Marital status: Single     Spouse name: None    Number of children: None    Years of education: None    Highest education level: None   Tobacco Use    Smoking status: Former     Packs/day: 0.25     Types: Cigarettes     Start date: 2014     Quit date: 2021     Years since quittin.1    Smokeless tobacco: Never   Substance and Sexual Activity    Alcohol use: Never    Drug use: Never    Sexual activity: Yes     Partners: Male       SCREENINGS    [place] Coma Scale  Eye Opening: Spontaneous  Best Verbal Response: Oriented  Best Motor Response: Obeys commands  Jorge Alberto Coma Scale Score: 15      PHYSICAL EXAM    (up to 7 forlevel 4, 8 or more for level 5)     ED Triage Vitals [22 1716]   BP Temp Temp Source Heart Rate Resp SpO2 Height Weight   114/77 99 °F (37.2 °C) Temporal (!) 111 20 95 % -- --       Physical Exam  Vitals and nursing note reviewed. Constitutional:       General: She is not in acute distress. Appearance: She is well-developed. She is not diaphoretic. HENT:      Head: Normocephalic and atraumatic. Mouth/Throat:      Mouth: Mucous membranes are moist.      Pharynx: No oropharyngeal exudate. Eyes:      General: No scleral icterus. Conjunctiva/sclera: Conjunctivae normal.      Pupils: Pupils are equal, round, and reactive to light. Neck:      Trachea: No tracheal deviation. Cardiovascular:      Rate and Rhythm: Normal rate. Heart sounds: Normal heart sounds.    Pulmonary:      Effort: Pulmonary effort is normal. No respiratory distress. Breath sounds: Normal breath sounds. Abdominal:      General: Bowel sounds are normal. There is no distension. Palpations: Abdomen is soft. Tenderness: There is generalized abdominal tenderness. Musculoskeletal:         General: Normal range of motion. Cervical back: Normal range of motion and neck supple. No rigidity or tenderness. Lumbar back: Tenderness present. No swelling. Back:    Skin:     General: Skin is warm and dry. Findings: No erythema or rash. Neurological:      Mental Status: She is alert and oriented to person, place, and time. Cranial Nerves: No cranial nerve deficit. Motor: No abnormal muscle tone. Psychiatric:         Behavior: Behavior normal.         Thought Content: Thought content normal.         Judgment: Judgment normal.         DIAGNOSTIC RESULTS     RADIOLOGY:   Non-plain film images such as CT, Ultrasound and MRI are read by the radiologist. Plain radiographic images are visualized and preliminarilyinterpreted by Anna Marie Navarro PA-C with the below findings:        Interpretation per the Radiologist below, if available at the time of this note:    CT ABDOMEN PELVIS W IV CONTRAST Additional Contrast? None    (Results Pending)       LABS:  Labs Reviewed   CBC WITH AUTO DIFFERENTIAL   COMPREHENSIVE METABOLIC PANEL   URINALYSIS WITH REFLEX TO CULTURE   LACTIC ACID   POC PREGNANCY UR-QUAL   POCT CREATININE       All other labs were within normal range or not returnedas of this dictation. EMERGENCYDEPARTMENT COURSE and DIFFERENTIAL DIAGNOSIS/MDM:   Vitals:    Vitals:    11/24/22 1716   BP: 114/77   Pulse: (!) 111   Resp: 20   Temp: 99 °F (37.2 °C)   TempSrc: Temporal   SpO2: 95%       REASSESSMENT        After laboratory testing was obtained but patient not able to tolerate having IV started, she decided that she no longer wanted to be seen.   I discussed risk of life and limb by leaving AGAINST MEDICAL ADVICE as I do not what is causing her abdomen and back pain. Patient is aware of these risks and still would like to sign out [address] Patient was advised that she can return to the emergency department at any time if she seeks further evaluation and to contact her family doctor's office in the morning    MDM      PROCEDURES:    Procedures      FINAL IMPRESSION      1. Generalized abdominal pain    2. Pain in the coccyx          DISPOSITION/PLAN   DISPOSITION        PATIENT REFERRED TO:  No follow-up provider specified.     DISCHARGE MEDICATIONS:  New Prescriptions    No medications on file       (Please note that portions of this note were completed with a voice recognition program.  Efforts were made to edit the dictations but occasionally words are mis-transcribed.)    SHANTEL Brooke PA-C  11/24/22 3418

## 2022-11-24 NOTE — ED NOTES
Pt ambulates to restroom independently, no obvious s/s of distress noted.       Marie Gallo RN  11/24/22 4919

## 2022-11-24 NOTE — ED NOTES
During IV placement pt states she does not \"want to be poked\". Explained to pt this is needed for testing. Pt continues to decline wanting to be treated. AMA form signed by pt.       Lachelle Diez RN  11/24/22 9539

## 2022-11-25 LAB — URINE CULTURE, ROUTINE: NORMAL

## 2022-12-26 ENCOUNTER — HOSPITAL ENCOUNTER (EMERGENCY)
Age: 27
Discharge: HOME | End: 2022-12-26
Payer: COMMERCIAL

## 2022-12-26 VITALS
HEIGHT: 68 IN | SYSTOLIC BLOOD PRESSURE: 134 MMHG | HEART RATE: 87 BPM | TEMPERATURE: 98.1 F | DIASTOLIC BLOOD PRESSURE: 83 MMHG | RESPIRATION RATE: 16 BRPM | OXYGEN SATURATION: 98 % | BODY MASS INDEX: 30.77 KG/M2 | WEIGHT: 203 LBS

## 2022-12-26 DIAGNOSIS — K13.79 OTHER LESIONS OF ORAL MUCOSA: ICD-10-CM

## 2022-12-26 DIAGNOSIS — J02.9 VIRAL PHARYNGITIS: Primary | ICD-10-CM

## 2022-12-26 LAB — STREP GRP A PCR: NEGATIVE

## 2022-12-26 PROCEDURE — 6370000000 HC RX 637 (ALT 250 FOR IP): Performed by: PHYSICIAN ASSISTANT

## 2022-12-26 PROCEDURE — 87651 STREP A DNA AMP PROBE: CPT

## 2022-12-26 PROCEDURE — 99283 EMERGENCY DEPT VISIT LOW MDM: CPT

## 2022-12-26 RX ORDER — PREDNISONE 20 MG/1
40 TABLET ORAL ONCE
Status: COMPLETED | OUTPATIENT
Start: 2022-12-26 | End: 2022-12-26

## 2022-12-26 RX ADMIN — PREDNISONE 40 MG: 20 TABLET ORAL at 23:37

## 2022-12-26 ASSESSMENT — ENCOUNTER SYMPTOMS
ABDOMINAL DISTENTION: 0
ABDOMINAL PAIN: 0
SORE THROAT: 1
VOICE CHANGE: 1
EYE DISCHARGE: 0
COUGH: 0

## 2022-12-26 ASSESSMENT — PAIN SCALES - GENERAL: PAINLEVEL_OUTOF10: 7

## 2022-12-26 ASSESSMENT — PAIN DESCRIPTION - LOCATION: LOCATION: THROAT

## 2022-12-26 ASSESSMENT — PAIN - FUNCTIONAL ASSESSMENT: PAIN_FUNCTIONAL_ASSESSMENT: 0-10

## 2022-12-26 NOTE — Clinical Note
[facility] accompanied Kurt Escalera to the emergency department on 12/26/2022. They may return to work on 12/27/2022. If you have any questions or concerns, please don't hesitate to call.       Jessie Peters PA-C

## 2022-12-26 NOTE — Clinical Note
Kristy Dominguez was seen and treated in our emergency department on 12/26/2022. She may return to work on 12/30/2022. If you have any questions or concerns, please don't hesitate to call.       Doroteo Marcus PA-C

## 2022-12-26 NOTE — Clinical Note
[facility] accompanied Ariadna Duran to the emergency department on 12/26/2022. They may return to work on 12/27/2022. If you have any questions or concerns, please don't hesitate to call.       Alexa Jalloh PA-C

## 2022-12-27 NOTE — ED PROVIDER NOTES
[address] [facility] ED  eMERGENCY dEPARTMENT eNCOUnter      Pt Name: Gabriela Fuchs  MRN: [id number]  [place] 1995  Date of evaluation: 12/26/2022  Provider: Carmela Yoder PA-C    CHIEF COMPLAINT       Chief Complaint   Patient presents with    Pharyngitis         HISTORY OF PRESENT ILLNESS   (Location/Symptom, Timing/Onset,Context/Setting, Quality, Duration, Modifying Factors, Severity)  Note limiting factors. Gabriela Fuchs is a 33 y.o. female who presents to the emergency department patient has 2-week history of sore throat she notes that she has had swollen tonsils pharyngitis and had tonsil stones which she has had in the past she also notes rash on the upper portion of her mouth. Denies fever chills cough nausea vomiting. Symptoms mild to moderate severity touch motion worsens symptoms nothing improves symptoms she is taking over-the-counter family without relief. HPI    NursingNotes were reviewed. REVIEW OF SYSTEMS    (2-9 systems for level 4, 10 or more for level 5)     Review of Systems   Constitutional:  Negative for activity change, appetite change, fever and unexpected weight change. HENT:  Positive for sore throat and voice change. Negative for congestion, ear discharge, ear pain and nosebleeds. Eyes:  Negative for discharge. Respiratory:  Negative for cough. Cardiovascular:  Negative for chest pain. Gastrointestinal:  Negative for abdominal distention and abdominal pain. Genitourinary:  Negative for dysuria and hematuria. Musculoskeletal:  Negative for joint swelling. Skin:  Negative for pallor. Neurological:  Negative for seizures and facial asymmetry. Psychiatric/Behavioral:  Negative for behavioral problems, self-injury and sleep disturbance. All other systems reviewed and are negative. Except as noted above the remainder of the review of systems was reviewed and negative.        PAST MEDICAL HISTORY     Past Medical History:   Diagnosis Date Anxiety     Asthma     Bipolar depression ([facility]     Migraines          SURGICALHISTORY     History reviewed. No pertinent surgical history. CURRENT MEDICATIONS       Previous Medications    AIMOVIG 140 MG/ML SOAJ    Inject 1 mL into the skin every 30 days    ASMANEX, 30 METERED DOSES, 220 MCG/INH INHALER    INHALE 1 PUFF into the lungs DAILY    BUSPIRONE (BUSPAR) 10 MG TABLET    Take 1 tablet by mouth twice a day with meals    CETIRIZINE (ZYRTEC) 10 MG TABLET    Take 1 tablet by mouth daily    CETIRIZINE-PSUEDOEPHEDRINE (ZYRTEC-D) 5-120 MG PER EXTENDED RELEASE TABLET    TAKE 1 TABLET BY MOUTH TWICE DAILY    DICLOFENAC SODIUM (VOLTAREN) 1 % GEL    Apply 2 g topically 4 times daily    DOXEPIN (SINEQUAN) 10 MG CAPSULE    TAKE 2 CAPSULES BY MOUTH AT BEDTIME. TAKE 1&1/2 HOURS before bedtime. FEROSUL 325 (65 FE) MG TABLET    Take 1 tablet by mouth daily (with breakfast)    HYDROXYZINE (VISTARIL) 25 MG CAPSULE    TAKE 1 CAPSULE BY MOUTH FOUR TIMES DAILY AS NEEDED FOR ANXIETY    LATUDA 60 MG TABS TABLET    Take 1 tablet by mouth every evening Take with at least 350 calories. METHYLPREDNISOLONE (MEDROL, JEZ,) 4 MG TABLET    Take by mouth.     MONTELUKAST (SINGULAIR) 10 MG TABLET    Take 1 tablet by mouth nightly    NARATRIPTAN (AMERGE) 2.5 MG TABLET    Take 1 tablet by mouth once as needed for Migraine 2.5 mg at onset of headache, may repeat in 4 hours if needed    NURTEC 75 MG TBDP    Dissolve 1 tablet in the mouth daily as needed (migraine)    ONDANSETRON (ZOFRAN-ODT) 4 MG DISINTEGRATING TABLET    DISSOLVE 1 (ONE) TABLET BY MOUTH EVERY 8 HOURS    PANTOPRAZOLE (PROTONIX) 40 MG TABLET    TAKE 1 TABLET BY MOUTH DAILY    PROAIR  (90 BASE) MCG/ACT INHALER    INHALE 2 PUFFS into the lungs FOUR TIMES DAILY AS NEEDED FOR WHEEZING    TIZANIDINE (ZANAFLEX) 4 MG TABLET    TAKE 1 TABLET BY MOUTH EVERY 8 HOURS AS NEEDED for spasms    TOPIRAMATE (TOPAMAX) 100 MG TABLET    TAKE 1 TABLET BY MOUTH TWICE DAILY TRIAMCINOLONE (KENALOG) 0.1 % CREAM    Apply topically 2 times daily. VITAMIN D (ERGOCALCIFEROL) 1.25 MG (31783 UT) CAPS CAPSULE    TAKE 1 CAPSULE BY MOUTH ONCE A WEEK            Aspirin and Vicodin [hydrocodone-acetaminophen]    FAMILY HISTORY       Family History   Problem Relation Age of Onset    Mental Illness Mother     Anxiety Disorder Mother     Schizophrenia Mother     Cancer Father         throat cancer    Allergies Brother           SOCIAL HISTORY       Social History     Socioeconomic History    Marital status: Single     Spouse name: None    Number of children: None    Years of education: None    Highest education level: None   Tobacco Use    Smoking status: Former     Packs/day: 0.25     Types: Cigarettes     Start date: 2014     Quit date: 2021     Years since quittin.2    Smokeless tobacco: Never   Substance and Sexual Activity    Alcohol use: Never    Drug use: Never    Sexual activity: Yes     Partners: Male       SCREENINGS   Jorge Alberto Coma Scale  Eye Opening: Spontaneous  Best Verbal Response: Oriented  Best Motor Response: Obeys commands  Jorge Alberto Coma Scale Score: 15  Ebola Virus Disease (EVD) Screening   Temp: 98.1 °F (36.7 °C)  CIWA Assessment  BP: 134/83  Heart Rate: 87    PHYSICAL EXAM    (up to 7 for level 4, 8 or more for level 5)     ED Triage Vitals [22 2234]   BP Temp Temp src Heart Rate Resp SpO2 Height Weight   134/83 98.1 °F (36.7 °C) -- 87 16 98 % 5' 8\" (1.727 m) 203 lb (92.1 kg)       Physical Exam  Vitals and nursing note reviewed. Constitutional:       General: She is not in acute distress. Appearance: She is well-developed. HENT:      Head: Normocephalic and atraumatic. Right Ear: Tympanic membrane and ear canal normal.      Left Ear: Tympanic membrane and ear canal normal.      Mouth/Throat:      Mouth: Mucous membranes are moist. Oral lesions present. Pharynx: Posterior oropharyngeal erythema present. No pharyngeal swelling.       Tonsils: No tonsillar exudate or tonsillar abscesses. 1+ on the right. 1+ on the left. Comments: Lesions noted hard and soft palate negative tongue rounded some raised some flattened patient's been trying to open them. Eyes:      General:         Right eye: No discharge. Left eye: No discharge. Pupils: Pupils are equal, round, and reactive to light. Cardiovascular:      Rate and Rhythm: Normal rate and regular rhythm. Heart sounds: Normal heart sounds. Pulmonary:      Effort: Pulmonary effort is normal. No respiratory distress. Breath sounds: Normal breath sounds. No stridor. Abdominal:      General: Bowel sounds are normal. There is no distension. Palpations: Abdomen is soft. Musculoskeletal:         General: Normal range of motion. Cervical back: Normal range of motion and neck supple. Skin:     General: Skin is warm. Findings: No erythema. Neurological:      Mental Status: She is alert and oriented to person, place, and time. Psychiatric:         Mood and Affect: Mood normal.       RESULTS     EKG: All EKG's are interpreted by the Emergency Department Physician who either signs or Co-signsthis chart in the absence of a cardiologist.         RADIOLOGY:   Natalie Morelle such as CT, Ultrasound and MRI are read by the radiologist. Plain radiographic images are visualized and preliminarily interpreted by the emergency physician with the below findings:         Interpretation per the Radiologist below, if available at the time ofthis note:    No orders to display         ED BEDSIDE ULTRASOUND:   Performed by ED Physician - none    LABS:  Labs Reviewed   RAPID STREP SCREEN       All other labs were within normal range or not returned as of this dictation.     EMERGENCY DEPARTMENT COURSE and DIFFERENTIAL DIAGNOSIS/MDM:   Vitals:    Vitals:    12/26/22 2234   BP: 134/83   Pulse: 87   Resp: 16   Temp: 98.1 °F (36.7 °C)   SpO2: 98%   Weight: 203 lb (92.1 kg)   Height: 5' 8\" (1.727 m)            MDM  Number of Diagnoses or Management Options  Diagnosis management comments: Patient presents with 2-week history of oral lesions which gotten worse and sore throat she does have tonsil stones which she is removed. Patient denies fever chills. She has had a history of tonsil stones notes the lesions were rounded only on the roof of her mouth. They appear some rounded some oval majority are flat 1 raised lesion with red base no drainable fluid collections appears to be viral pharyngitis negative strep test will disposition on prednisone as patient has difficulty with some medications upsetting her stomach she has been taking 500 mg \"pain reliever\" which has not been helping. Amount and/or Complexity of Data Reviewed  Clinical lab tests: reviewed and ordered        CRITICAL CARE TIME       CONSULTS:  None    PROCEDURES:  Unless otherwise noted below, none     Procedures    FINAL IMPRESSION      1. Viral pharyngitis    2.  Other lesions of oral mucosa          DISPOSITION/PLAN   DISPOSITION Decision To Discharge 12/26/2022 11:48:21 PM      PATIENT REFERRED TO:  [place] [phone]    Call in 1 day      [facility] ED  [address] [address]  [phone]  Go to   If symptoms worsen    DISCHARGE MEDICATIONS:  New Prescriptions    No medications on file          (Please note that portions of this note were completed with a voice recognition program.  Efforts were made to edit the dictations but occasionally words are mis-transcribed.)    Tyson Diaz PA-C (electronically signed)  Attending Emergency Physician        Tyson Diaz PA-C  12/26/22 3420

## 2023-02-27 ENCOUNTER — APPOINTMENT (OUTPATIENT)
Dept: CT IMAGING | Age: 28
DRG: 263 | End: 2023-02-27
Payer: COMMERCIAL

## 2023-02-27 ENCOUNTER — HOSPITAL ENCOUNTER (INPATIENT)
Age: 28
LOS: 1 days | Discharge: HOME | DRG: 263 | End: 2023-03-01
Attending: SURGERY | Admitting: SURGERY
Payer: COMMERCIAL

## 2023-02-27 DIAGNOSIS — K81.0 ACUTE CHOLECYSTITIS: ICD-10-CM

## 2023-02-27 DIAGNOSIS — R11.2 NAUSEA AND VOMITING, UNSPECIFIED VOMITING TYPE: ICD-10-CM

## 2023-02-27 DIAGNOSIS — K80.00 CALCULUS OF GALLBLADDER WITH ACUTE CHOLECYSTITIS WITHOUT OBSTRUCTION: Primary | ICD-10-CM

## 2023-02-27 DIAGNOSIS — G89.18 POST-OPERATIVE PAIN: ICD-10-CM

## 2023-02-27 LAB
ALBUMIN SERPL-MCNC: 4 G/DL (ref 3.5–4.6)
ALP BLD-CCNC: 70 U/L (ref 40–130)
ALT SERPL-CCNC: 23 U/L (ref 0–33)
AMPHETAMINE SCREEN, URINE: ABNORMAL
ANION GAP SERPL CALCULATED.3IONS-SCNC: 11 MEQ/L (ref 9–15)
AST SERPL-CCNC: 31 U/L (ref 0–35)
BARBITURATE SCREEN URINE: ABNORMAL
BASOPHILS ABSOLUTE: 0.1 K/UL (ref 0–0.2)
BASOPHILS RELATIVE PERCENT: 0.6 %
BENZODIAZEPINE SCREEN, URINE: ABNORMAL
BILIRUB SERPL-MCNC: <0.2 MG/DL (ref 0.2–0.7)
BILIRUBIN URINE: NEGATIVE
BLOOD, URINE: NEGATIVE
BUN BLDV-MCNC: 9 MG/DL (ref 6–20)
CALCIUM SERPL-MCNC: 8.7 MG/DL (ref 8.5–9.9)
CANNABINOID SCREEN URINE: POSITIVE
CHLORIDE BLD-SCNC: 100 MEQ/L (ref 95–107)
CLARITY: CLEAR
CO2: 25 MEQ/L (ref 20–31)
COCAINE METABOLITE SCREEN URINE: ABNORMAL
COLOR: YELLOW
CREAT SERPL-MCNC: 0.69 MG/DL (ref 0.5–0.9)
EOSINOPHILS ABSOLUTE: 0.2 K/UL (ref 0–0.7)
EOSINOPHILS RELATIVE PERCENT: 1.7 %
FENTANYL SCREEN, URINE: ABNORMAL
GFR SERPL CREATININE-BSD FRML MDRD: >60 ML/MIN/{1.73_M2}
GLOBULIN: 3.3 G/DL (ref 2.3–3.5)
GLUCOSE BLD-MCNC: 121 MG/DL (ref 70–99)
GLUCOSE URINE: NEGATIVE MG/DL
HCG(URINE) PREGNANCY TEST: NEGATIVE
HCT VFR BLD CALC: 41.6 % (ref 37–47)
HEMOGLOBIN: 13.8 G/DL (ref 12–16)
KETONES, URINE: NEGATIVE MG/DL
LEUKOCYTE ESTERASE, URINE: NEGATIVE
LIPASE: 25 U/L (ref 12–95)
LYMPHOCYTES ABSOLUTE: 1.9 K/UL (ref 1–4.8)
LYMPHOCYTES RELATIVE PERCENT: 17.2 %
Lab: ABNORMAL
MCH RBC QN AUTO: 27.7 PG (ref 27–31.3)
MCHC RBC AUTO-ENTMCNC: 33.3 % (ref 33–37)
MCV RBC AUTO: 83.3 FL (ref 79.4–94.8)
METHADONE SCREEN, URINE: ABNORMAL
MONOCYTES ABSOLUTE: 0.4 K/UL (ref 0.2–0.8)
MONOCYTES RELATIVE PERCENT: 3.9 %
NEUTROPHILS ABSOLUTE: 8.6 K/UL (ref 1.4–6.5)
NEUTROPHILS RELATIVE PERCENT: 76.6 %
NITRITE, URINE: NEGATIVE
OPIATE SCREEN URINE: ABNORMAL
OXYCODONE URINE: ABNORMAL
PDW BLD-RTO: 14.9 % (ref 11.5–14.5)
PH UA: 6 (ref 5–9)
PHENCYCLIDINE SCREEN URINE: ABNORMAL
PLATELET # BLD: 286 K/UL (ref 130–400)
POC CREATININE WHOLE BLOOD: 0.7
POTASSIUM SERPL-SCNC: 4.8 MEQ/L (ref 3.4–4.9)
PROPOXYPHENE SCREEN: ABNORMAL
PROTEIN UA: NEGATIVE MG/DL
RBC # BLD: 4.99 M/UL (ref 4.2–5.4)
SODIUM BLD-SCNC: 136 MEQ/L (ref 135–144)
SPECIFIC GRAVITY UA: 1.02 (ref 1–1.03)
TOTAL PROTEIN: 7.3 G/DL (ref 6.3–8)
URINE REFLEX TO CULTURE: NORMAL
UROBILINOGEN, URINE: 0.2 E.U./DL
WBC # BLD: 11.2 K/UL (ref 4.8–10.8)

## 2023-02-27 PROCEDURE — 80307 DRUG TEST PRSMV CHEM ANLYZR: CPT

## 2023-02-27 PROCEDURE — 6360000004 HC RX CONTRAST MEDICATION: Performed by: PHYSICIAN ASSISTANT

## 2023-02-27 PROCEDURE — 96372 THER/PROPH/DIAG INJ SC/IM: CPT

## 2023-02-27 PROCEDURE — 85025 COMPLETE CBC W/AUTO DIFF WBC: CPT

## 2023-02-27 PROCEDURE — 83690 ASSAY OF LIPASE: CPT

## 2023-02-27 PROCEDURE — 6360000002 HC RX W HCPCS: Performed by: PHYSICIAN ASSISTANT

## 2023-02-27 PROCEDURE — 96374 THER/PROPH/DIAG INJ IV PUSH: CPT

## 2023-02-27 PROCEDURE — 99285 EMERGENCY DEPT VISIT HI MDM: CPT

## 2023-02-27 PROCEDURE — 96375 TX/PRO/DX INJ NEW DRUG ADDON: CPT

## 2023-02-27 PROCEDURE — 81003 URINALYSIS AUTO W/O SCOPE: CPT

## 2023-02-27 PROCEDURE — 2580000003 HC RX 258: Performed by: PHYSICIAN ASSISTANT

## 2023-02-27 PROCEDURE — 36415 COLL VENOUS BLD VENIPUNCTURE: CPT

## 2023-02-27 PROCEDURE — 80053 COMPREHEN METABOLIC PANEL: CPT

## 2023-02-27 PROCEDURE — 74177 CT ABD & PELVIS W/CONTRAST: CPT

## 2023-02-27 PROCEDURE — 84703 CHORIONIC GONADOTROPIN ASSAY: CPT

## 2023-02-27 RX ORDER — FENTANYL CITRATE 0.05 MG/ML
50 INJECTION, SOLUTION INTRAMUSCULAR; INTRAVENOUS ONCE
Status: COMPLETED | OUTPATIENT
Start: 2023-02-27 | End: 2023-02-27

## 2023-02-27 RX ORDER — FENTANYL CITRATE 0.05 MG/ML
50 INJECTION, SOLUTION INTRAMUSCULAR; INTRAVENOUS ONCE
Status: COMPLETED | OUTPATIENT
Start: 2023-02-27 | End: 2023-02-28

## 2023-02-27 RX ORDER — ONDANSETRON 2 MG/ML
4 INJECTION INTRAMUSCULAR; INTRAVENOUS ONCE
Status: COMPLETED | OUTPATIENT
Start: 2023-02-27 | End: 2023-02-27

## 2023-02-27 RX ORDER — 0.9 % SODIUM CHLORIDE 0.9 %
1000 INTRAVENOUS SOLUTION INTRAVENOUS ONCE
Status: COMPLETED | OUTPATIENT
Start: 2023-02-27 | End: 2023-02-27

## 2023-02-27 RX ORDER — DICYCLOMINE HYDROCHLORIDE 10 MG/ML
20 INJECTION INTRAMUSCULAR ONCE
Status: COMPLETED | OUTPATIENT
Start: 2023-02-27 | End: 2023-02-27

## 2023-02-27 RX ADMIN — SODIUM CHLORIDE 1000 ML: 9 INJECTION, SOLUTION INTRAVENOUS at 22:27

## 2023-02-27 RX ADMIN — DICYCLOMINE HYDROCHLORIDE 20 MG: 10 INJECTION, SOLUTION INTRAMUSCULAR at 22:29

## 2023-02-27 RX ADMIN — FENTANYL CITRATE 50 MCG: 0.05 INJECTION, SOLUTION INTRAMUSCULAR; INTRAVENOUS at 23:02

## 2023-02-27 RX ADMIN — ONDANSETRON 4 MG: 2 INJECTION INTRAMUSCULAR; INTRAVENOUS at 22:29

## 2023-02-27 RX ADMIN — IOPAMIDOL 50 ML: 612 INJECTION, SOLUTION INTRAVENOUS at 23:34

## 2023-02-27 ASSESSMENT — ENCOUNTER SYMPTOMS
ABDOMINAL PAIN: 1
APNEA: 0
VOMITING: 1
BACK PAIN: 1
COUGH: 0
ANAL BLEEDING: 0
VOICE CHANGE: 0
NAUSEA: 1
ABDOMINAL DISTENTION: 0
EYE DISCHARGE: 0

## 2023-02-27 ASSESSMENT — PAIN DESCRIPTION - LOCATION: LOCATION: ABDOMEN

## 2023-02-27 ASSESSMENT — PAIN SCALES - GENERAL: PAINLEVEL_OUTOF10: 10

## 2023-02-27 ASSESSMENT — PAIN DESCRIPTION - PAIN TYPE: TYPE: ACUTE PAIN

## 2023-02-27 ASSESSMENT — PAIN - FUNCTIONAL ASSESSMENT: PAIN_FUNCTIONAL_ASSESSMENT: 0-10

## 2023-02-28 ENCOUNTER — ANESTHESIA (OUTPATIENT)
Dept: OPERATING ROOM | Age: 28
DRG: 263 | End: 2023-02-28
Payer: COMMERCIAL

## 2023-02-28 ENCOUNTER — ANESTHESIA EVENT (OUTPATIENT)
Dept: OPERATING ROOM | Age: 28
DRG: 263 | End: 2023-02-28
Payer: COMMERCIAL

## 2023-02-28 PROBLEM — K81.0 ACUTE CHOLECYSTITIS: Status: ACTIVE | Noted: 2023-02-28

## 2023-02-28 LAB
ABO/RH: NORMAL
ALBUMIN SERPL-MCNC: 3.6 G/DL (ref 3.5–4.6)
ALP BLD-CCNC: 64 U/L (ref 40–130)
ALT SERPL-CCNC: 20 U/L (ref 0–33)
ANION GAP SERPL CALCULATED.3IONS-SCNC: 9 MEQ/L (ref 9–15)
ANTIBODY SCREEN: NORMAL
AST SERPL-CCNC: 19 U/L (ref 0–35)
BILIRUB SERPL-MCNC: 0.4 MG/DL (ref 0.2–0.7)
BUN BLDV-MCNC: 8 MG/DL (ref 6–20)
CALCIUM SERPL-MCNC: 8.1 MG/DL (ref 8.5–9.9)
CHLORIDE BLD-SCNC: 103 MEQ/L (ref 95–107)
CO2: 24 MEQ/L (ref 20–31)
CREAT SERPL-MCNC: 0.57 MG/DL (ref 0.5–0.9)
GFR SERPL CREATININE-BSD FRML MDRD: >60 ML/MIN/{1.73_M2}
GFR SERPL CREATININE-BSD FRML MDRD: >60 ML/MIN/{1.73_M2}
GLOBULIN: 2.9 G/DL (ref 2.3–3.5)
GLUCOSE BLD-MCNC: 125 MG/DL (ref 70–99)
HCT VFR BLD CALC: 40.2 % (ref 37–47)
HEMOGLOBIN: 13.5 G/DL (ref 12–16)
MAGNESIUM: 1.9 MG/DL (ref 1.7–2.4)
MCH RBC QN AUTO: 28.1 PG (ref 27–31.3)
MCHC RBC AUTO-ENTMCNC: 33.5 % (ref 33–37)
MCV RBC AUTO: 83.8 FL (ref 79.4–94.8)
PDW BLD-RTO: 15 % (ref 11.5–14.5)
PERFORMED ON: NORMAL
PLATELET # BLD: 260 K/UL (ref 130–400)
POC CREATININE: 0.7 MG/DL (ref 0.6–1.2)
POC SAMPLE TYPE: NORMAL
POTASSIUM SERPL-SCNC: 4 MEQ/L (ref 3.4–4.9)
RBC # BLD: 4.8 M/UL (ref 4.2–5.4)
SODIUM BLD-SCNC: 136 MEQ/L (ref 135–144)
TOTAL PROTEIN: 6.5 G/DL (ref 6.3–8)
WBC # BLD: 12.2 K/UL (ref 4.8–10.8)

## 2023-02-28 PROCEDURE — 0FT44ZZ RESECTION OF GALLBLADDER, PERCUTANEOUS ENDOSCOPIC APPROACH: ICD-10-PCS | Performed by: SURGERY

## 2023-02-28 PROCEDURE — 6370000000 HC RX 637 (ALT 250 FOR IP): Performed by: PHYSICIAN ASSISTANT

## 2023-02-28 PROCEDURE — 2720000010 HC SURG SUPPLY STERILE: Performed by: SURGERY

## 2023-02-28 PROCEDURE — 85027 COMPLETE CBC AUTOMATED: CPT

## 2023-02-28 PROCEDURE — 2580000003 HC RX 258: Performed by: SURGERY

## 2023-02-28 PROCEDURE — 6360000002 HC RX W HCPCS: Performed by: PHYSICIAN ASSISTANT

## 2023-02-28 PROCEDURE — 3600000003 HC SURGERY LEVEL 3 BASE: Performed by: SURGERY

## 2023-02-28 PROCEDURE — 2709999900 HC NON-CHARGEABLE SUPPLY: Performed by: SURGERY

## 2023-02-28 PROCEDURE — 2580000003 HC RX 258: Performed by: NURSE ANESTHETIST, CERTIFIED REGISTERED

## 2023-02-28 PROCEDURE — 6360000002 HC RX W HCPCS: Performed by: NURSE ANESTHETIST, CERTIFIED REGISTERED

## 2023-02-28 PROCEDURE — 96376 TX/PRO/DX INJ SAME DRUG ADON: CPT

## 2023-02-28 PROCEDURE — 1210000000 HC MED SURG R&B

## 2023-02-28 PROCEDURE — 80053 COMPREHEN METABOLIC PANEL: CPT

## 2023-02-28 PROCEDURE — 3700000000 HC ANESTHESIA ATTENDED CARE: Performed by: SURGERY

## 2023-02-28 PROCEDURE — 86850 RBC ANTIBODY SCREEN: CPT

## 2023-02-28 PROCEDURE — A4217 STERILE WATER/SALINE, 500 ML: HCPCS | Performed by: SURGERY

## 2023-02-28 PROCEDURE — 6360000002 HC RX W HCPCS: Performed by: SURGERY

## 2023-02-28 PROCEDURE — 2580000003 HC RX 258: Performed by: EMERGENCY MEDICINE

## 2023-02-28 PROCEDURE — 36415 COLL VENOUS BLD VENIPUNCTURE: CPT

## 2023-02-28 PROCEDURE — 2500000003 HC RX 250 WO HCPCS: Performed by: NURSE ANESTHETIST, CERTIFIED REGISTERED

## 2023-02-28 PROCEDURE — 86901 BLOOD TYPING SEROLOGIC RH(D): CPT

## 2023-02-28 PROCEDURE — 6360000002 HC RX W HCPCS: Performed by: EMERGENCY MEDICINE

## 2023-02-28 PROCEDURE — 6360000002 HC RX W HCPCS: Performed by: ANESTHESIOLOGY

## 2023-02-28 PROCEDURE — 7100000000 HC PACU RECOVERY - FIRST 15 MIN: Performed by: SURGERY

## 2023-02-28 PROCEDURE — 3700000001 HC ADD 15 MINUTES (ANESTHESIA): Performed by: SURGERY

## 2023-02-28 PROCEDURE — 7100000001 HC PACU RECOVERY - ADDTL 15 MIN: Performed by: SURGERY

## 2023-02-28 PROCEDURE — 99252 IP/OBS CONSLTJ NEW/EST SF 35: CPT | Performed by: SURGERY

## 2023-02-28 PROCEDURE — 76942 ECHO GUIDE FOR BIOPSY: CPT | Performed by: ANESTHESIOLOGY

## 2023-02-28 PROCEDURE — 2500000003 HC RX 250 WO HCPCS: Performed by: ANESTHESIOLOGY

## 2023-02-28 PROCEDURE — 83735 ASSAY OF MAGNESIUM: CPT

## 2023-02-28 PROCEDURE — 86900 BLOOD TYPING SEROLOGIC ABO: CPT

## 2023-02-28 PROCEDURE — 3600000013 HC SURGERY LEVEL 3 ADDTL 15MIN: Performed by: SURGERY

## 2023-02-28 RX ORDER — SODIUM CHLORIDE 0.9 % (FLUSH) 0.9 %
5-40 SYRINGE (ML) INJECTION EVERY 12 HOURS SCHEDULED
Status: DISCONTINUED | OUTPATIENT
Start: 2023-02-28 | End: 2023-02-28 | Stop reason: HOSPADM

## 2023-02-28 RX ORDER — SODIUM CHLORIDE 0.9 % (FLUSH) 0.9 %
5-40 SYRINGE (ML) INJECTION PRN
Status: DISCONTINUED | OUTPATIENT
Start: 2023-02-28 | End: 2023-03-01 | Stop reason: HOSPADM

## 2023-02-28 RX ORDER — SODIUM CHLORIDE 0.9 % (FLUSH) 0.9 %
5-40 SYRINGE (ML) INJECTION EVERY 12 HOURS SCHEDULED
Status: DISCONTINUED | OUTPATIENT
Start: 2023-02-28 | End: 2023-03-01 | Stop reason: HOSPADM

## 2023-02-28 RX ORDER — OXYCODONE HYDROCHLORIDE 5 MG/1
5 TABLET ORAL EVERY 4 HOURS PRN
Status: DISCONTINUED | OUTPATIENT
Start: 2023-02-28 | End: 2023-03-01 | Stop reason: HOSPADM

## 2023-02-28 RX ORDER — ENOXAPARIN SODIUM 100 MG/ML
40 INJECTION SUBCUTANEOUS DAILY
Status: DISCONTINUED | OUTPATIENT
Start: 2023-02-28 | End: 2023-03-01 | Stop reason: HOSPADM

## 2023-02-28 RX ORDER — ONDANSETRON 2 MG/ML
INJECTION INTRAMUSCULAR; INTRAVENOUS PRN
Status: DISCONTINUED | OUTPATIENT
Start: 2023-02-28 | End: 2023-02-28 | Stop reason: SDUPTHER

## 2023-02-28 RX ORDER — SODIUM CHLORIDE 0.9 % (FLUSH) 0.9 %
5-40 SYRINGE (ML) INJECTION PRN
Status: DISCONTINUED | OUTPATIENT
Start: 2023-02-28 | End: 2023-02-28

## 2023-02-28 RX ORDER — MAGNESIUM HYDROXIDE 1200 MG/15ML
LIQUID ORAL CONTINUOUS PRN
Status: COMPLETED | OUTPATIENT
Start: 2023-02-28 | End: 2023-02-28

## 2023-02-28 RX ORDER — ONDANSETRON 2 MG/ML
4 INJECTION INTRAMUSCULAR; INTRAVENOUS ONCE
Status: COMPLETED | OUTPATIENT
Start: 2023-02-28 | End: 2023-02-28

## 2023-02-28 RX ORDER — POLYETHYLENE GLYCOL 3350 17 G/17G
17 POWDER, FOR SOLUTION ORAL DAILY PRN
Status: DISCONTINUED | OUTPATIENT
Start: 2023-02-28 | End: 2023-03-01 | Stop reason: HOSPADM

## 2023-02-28 RX ORDER — MIDAZOLAM HYDROCHLORIDE 1 MG/ML
INJECTION INTRAMUSCULAR; INTRAVENOUS PRN
Status: DISCONTINUED | OUTPATIENT
Start: 2023-02-28 | End: 2023-02-28 | Stop reason: SDUPTHER

## 2023-02-28 RX ORDER — SODIUM CHLORIDE 9 MG/ML
INJECTION, SOLUTION INTRAVENOUS CONTINUOUS
Status: DISCONTINUED | OUTPATIENT
Start: 2023-02-28 | End: 2023-02-28

## 2023-02-28 RX ORDER — ONDANSETRON 2 MG/ML
4 INJECTION INTRAMUSCULAR; INTRAVENOUS
Status: DISCONTINUED | OUTPATIENT
Start: 2023-02-28 | End: 2023-02-28

## 2023-02-28 RX ORDER — LIDOCAINE HYDROCHLORIDE 20 MG/ML
INJECTION, SOLUTION INTRAVENOUS PRN
Status: DISCONTINUED | OUTPATIENT
Start: 2023-02-28 | End: 2023-02-28 | Stop reason: SDUPTHER

## 2023-02-28 RX ORDER — SODIUM CHLORIDE 9 MG/ML
INJECTION, SOLUTION INTRAVENOUS PRN
Status: DISCONTINUED | OUTPATIENT
Start: 2023-02-28 | End: 2023-02-28

## 2023-02-28 RX ORDER — ROCURONIUM BROMIDE 10 MG/ML
INJECTION, SOLUTION INTRAVENOUS PRN
Status: DISCONTINUED | OUTPATIENT
Start: 2023-02-28 | End: 2023-02-28 | Stop reason: SDUPTHER

## 2023-02-28 RX ORDER — BUPIVACAINE HYDROCHLORIDE AND EPINEPHRINE 5; 5 MG/ML; UG/ML
INJECTION, SOLUTION EPIDURAL; INTRACAUDAL; PERINEURAL PRN
Status: DISCONTINUED | OUTPATIENT
Start: 2023-02-28 | End: 2023-02-28 | Stop reason: SDUPTHER

## 2023-02-28 RX ORDER — OXYCODONE HYDROCHLORIDE 5 MG/1
10 TABLET ORAL EVERY 4 HOURS PRN
Status: DISCONTINUED | OUTPATIENT
Start: 2023-02-28 | End: 2023-03-01 | Stop reason: HOSPADM

## 2023-02-28 RX ORDER — KETOROLAC TROMETHAMINE 15 MG/ML
15 INJECTION, SOLUTION INTRAMUSCULAR; INTRAVENOUS EVERY 6 HOURS
Status: DISCONTINUED | OUTPATIENT
Start: 2023-02-28 | End: 2023-03-01 | Stop reason: HOSPADM

## 2023-02-28 RX ORDER — SENNA AND DOCUSATE SODIUM 50; 8.6 MG/1; MG/1
1 TABLET, FILM COATED ORAL 2 TIMES DAILY
Status: DISCONTINUED | OUTPATIENT
Start: 2023-02-28 | End: 2023-03-01 | Stop reason: HOSPADM

## 2023-02-28 RX ORDER — SODIUM CHLORIDE 9 MG/ML
INJECTION, SOLUTION INTRAVENOUS PRN
Status: DISCONTINUED | OUTPATIENT
Start: 2023-02-28 | End: 2023-03-01 | Stop reason: HOSPADM

## 2023-02-28 RX ORDER — DEXAMETHASONE SODIUM PHOSPHATE 4 MG/ML
INJECTION, SOLUTION INTRA-ARTICULAR; INTRALESIONAL; INTRAMUSCULAR; INTRAVENOUS; SOFT TISSUE PRN
Status: DISCONTINUED | OUTPATIENT
Start: 2023-02-28 | End: 2023-02-28 | Stop reason: SDUPTHER

## 2023-02-28 RX ORDER — FENTANYL CITRATE 0.05 MG/ML
25 INJECTION, SOLUTION INTRAMUSCULAR; INTRAVENOUS EVERY 5 MIN PRN
Status: DISCONTINUED | OUTPATIENT
Start: 2023-02-28 | End: 2023-02-28

## 2023-02-28 RX ORDER — PROPOFOL 10 MG/ML
INJECTION, EMULSION INTRAVENOUS PRN
Status: DISCONTINUED | OUTPATIENT
Start: 2023-02-28 | End: 2023-02-28 | Stop reason: SDUPTHER

## 2023-02-28 RX ORDER — ONDANSETRON 2 MG/ML
4 INJECTION INTRAMUSCULAR; INTRAVENOUS EVERY 6 HOURS PRN
Status: DISCONTINUED | OUTPATIENT
Start: 2023-02-28 | End: 2023-03-01 | Stop reason: HOSPADM

## 2023-02-28 RX ORDER — FENTANYL CITRATE 0.05 MG/ML
50 INJECTION, SOLUTION INTRAMUSCULAR; INTRAVENOUS EVERY 5 MIN PRN
Status: DISCONTINUED | OUTPATIENT
Start: 2023-02-28 | End: 2023-02-28

## 2023-02-28 RX ORDER — ACETAMINOPHEN 325 MG/1
650 TABLET ORAL EVERY 6 HOURS
Status: DISCONTINUED | OUTPATIENT
Start: 2023-02-28 | End: 2023-03-01 | Stop reason: HOSPADM

## 2023-02-28 RX ORDER — METHOCARBAMOL 100 MG/ML
500 INJECTION, SOLUTION INTRAMUSCULAR; INTRAVENOUS EVERY 6 HOURS
Status: DISCONTINUED | OUTPATIENT
Start: 2023-02-28 | End: 2023-03-01 | Stop reason: HOSPADM

## 2023-02-28 RX ORDER — ONDANSETRON 4 MG/1
4 TABLET, ORALLY DISINTEGRATING ORAL EVERY 8 HOURS PRN
Status: DISCONTINUED | OUTPATIENT
Start: 2023-02-28 | End: 2023-03-01 | Stop reason: HOSPADM

## 2023-02-28 RX ADMIN — METHOCARBAMOL 500 MG: 100 INJECTION INTRAMUSCULAR; INTRAVENOUS at 18:50

## 2023-02-28 RX ADMIN — ENOXAPARIN SODIUM 40 MG: 100 INJECTION SUBCUTANEOUS at 21:59

## 2023-02-28 RX ADMIN — ACETAMINOPHEN 650 MG: 325 TABLET ORAL at 18:49

## 2023-02-28 RX ADMIN — SODIUM CHLORIDE: 9 INJECTION, SOLUTION INTRAVENOUS at 15:26

## 2023-02-28 RX ADMIN — OXYCODONE 10 MG: 5 TABLET ORAL at 09:52

## 2023-02-28 RX ADMIN — SODIUM CHLORIDE: 9 INJECTION, SOLUTION INTRAVENOUS at 15:10

## 2023-02-28 RX ADMIN — HYDROMORPHONE HYDROCHLORIDE 0.5 MG: 1 INJECTION, SOLUTION INTRAMUSCULAR; INTRAVENOUS; SUBCUTANEOUS at 04:09

## 2023-02-28 RX ADMIN — SODIUM CHLORIDE: 9 INJECTION, SOLUTION INTRAVENOUS at 09:48

## 2023-02-28 RX ADMIN — OXYCODONE 10 MG: 5 TABLET ORAL at 17:58

## 2023-02-28 RX ADMIN — ONDANSETRON 4 MG: 2 INJECTION INTRAMUSCULAR; INTRAVENOUS at 15:23

## 2023-02-28 RX ADMIN — ONDANSETRON 4 MG: 2 INJECTION INTRAMUSCULAR; INTRAVENOUS at 01:07

## 2023-02-28 RX ADMIN — DEXAMETHASONE SODIUM PHOSPHATE 8 MG: 4 INJECTION, SOLUTION INTRAMUSCULAR; INTRAVENOUS at 14:15

## 2023-02-28 RX ADMIN — ROCURONIUM BROMIDE 50 MG: 10 INJECTION, SOLUTION INTRAVENOUS at 13:59

## 2023-02-28 RX ADMIN — FENTANYL CITRATE 50 MCG: 0.05 INJECTION, SOLUTION INTRAMUSCULAR; INTRAVENOUS at 16:25

## 2023-02-28 RX ADMIN — PIPERACILLIN AND TAZOBACTAM 3375 MG: 3; .375 INJECTION, POWDER, FOR SOLUTION INTRAVENOUS at 02:58

## 2023-02-28 RX ADMIN — DOCUSATE SODIUM 50 MG AND SENNOSIDES 8.6 MG 1 TABLET: 8.6; 5 TABLET, FILM COATED ORAL at 21:59

## 2023-02-28 RX ADMIN — FENTANYL CITRATE 50 MCG: 0.05 INJECTION, SOLUTION INTRAMUSCULAR; INTRAVENOUS at 00:02

## 2023-02-28 RX ADMIN — HYDROMORPHONE HYDROCHLORIDE 0.5 MG: 1 INJECTION, SOLUTION INTRAMUSCULAR; INTRAVENOUS; SUBCUTANEOUS at 07:02

## 2023-02-28 RX ADMIN — HYDROMORPHONE HYDROCHLORIDE 0.5 MG: 1 INJECTION, SOLUTION INTRAMUSCULAR; INTRAVENOUS; SUBCUTANEOUS at 01:08

## 2023-02-28 RX ADMIN — PROPOFOL 200 MG: 10 INJECTION, EMULSION INTRAVENOUS at 13:59

## 2023-02-28 RX ADMIN — SUGAMMADEX 200 MG: 100 INJECTION, SOLUTION INTRAVENOUS at 15:26

## 2023-02-28 RX ADMIN — KETOROLAC TROMETHAMINE 15 MG: 15 INJECTION, SOLUTION INTRAMUSCULAR; INTRAVENOUS at 18:12

## 2023-02-28 RX ADMIN — ACETAMINOPHEN 650 MG: 325 TABLET ORAL at 21:59

## 2023-02-28 RX ADMIN — SODIUM CHLORIDE: 9 INJECTION, SOLUTION INTRAVENOUS at 01:08

## 2023-02-28 RX ADMIN — OXYCODONE 10 MG: 5 TABLET ORAL at 21:20

## 2023-02-28 RX ADMIN — BUPIVACAINE HYDROCHLORIDE AND EPINEPHRINE BITARTRATE 30 ML: 5; .005 INJECTION, SOLUTION EPIDURAL; INTRACAUDAL; PERINEURAL at 13:46

## 2023-02-28 RX ADMIN — ACETAMINOPHEN 650 MG: 325 TABLET ORAL at 11:12

## 2023-02-28 RX ADMIN — MIDAZOLAM HYDROCHLORIDE 2 MG: 1 INJECTION, SOLUTION INTRAMUSCULAR; INTRAVENOUS at 13:42

## 2023-02-28 RX ADMIN — ONDANSETRON 4 MG: 2 INJECTION INTRAMUSCULAR; INTRAVENOUS at 07:01

## 2023-02-28 RX ADMIN — SODIUM CHLORIDE 2000 MG: 9 INJECTION, SOLUTION INTRAVENOUS at 13:55

## 2023-02-28 RX ADMIN — LIDOCAINE HYDROCHLORIDE 100 MG: 20 INJECTION, SOLUTION INTRAVENOUS at 13:59

## 2023-02-28 ASSESSMENT — PAIN DESCRIPTION - LOCATION
LOCATION: ABDOMEN;BACK
LOCATION: ABDOMEN
LOCATION: ABDOMEN;BACK
LOCATION: ABDOMEN
LOCATION: ABDOMEN
LOCATION: ABDOMEN;BACK
LOCATION: ABDOMEN;BACK
LOCATION: ABDOMEN
LOCATION: ABDOMEN;BACK
LOCATION: ABDOMEN

## 2023-02-28 ASSESSMENT — PAIN SCALES - GENERAL
PAINLEVEL_OUTOF10: 8
PAINLEVEL_OUTOF10: 5
PAINLEVEL_OUTOF10: 9
PAINLEVEL_OUTOF10: 4
PAINLEVEL_OUTOF10: 2
PAINLEVEL_OUTOF10: 5
PAINLEVEL_OUTOF10: 8
PAINLEVEL_OUTOF10: 8
PAINLEVEL_OUTOF10: 7
PAINLEVEL_OUTOF10: 5
PAINLEVEL_OUTOF10: 8

## 2023-02-28 ASSESSMENT — PAIN DESCRIPTION - PAIN TYPE
TYPE: ACUTE PAIN

## 2023-02-28 ASSESSMENT — ENCOUNTER SYMPTOMS
NAUSEA: 1
VOMITING: 1
ABDOMINAL PAIN: 1
RESPIRATORY NEGATIVE: 1
EYES NEGATIVE: 1

## 2023-02-28 ASSESSMENT — PAIN DESCRIPTION - ORIENTATION
ORIENTATION: LEFT
ORIENTATION: RIGHT;UPPER
ORIENTATION: RIGHT
ORIENTATION: RIGHT

## 2023-02-28 ASSESSMENT — PAIN DESCRIPTION - DESCRIPTORS
DESCRIPTORS: PRESSURE
DESCRIPTORS: PRESSURE;SHARP;STABBING
DESCRIPTORS: ACHING
DESCRIPTORS: ACHING
DESCRIPTORS: ACHING;SHARP

## 2023-02-28 ASSESSMENT — PAIN - FUNCTIONAL ASSESSMENT
PAIN_FUNCTIONAL_ASSESSMENT: 0-10
PAIN_FUNCTIONAL_ASSESSMENT: ACTIVITIES ARE NOT PREVENTED

## 2023-02-28 NOTE — ANESTHESIA PROCEDURE NOTES
Peripheral Block    Patient location during procedure: pre-op  Reason for block: post-op pain management and at surgeon's request  Start time: 2/28/2023 1:43 PM  End time: 2/28/2023 1:49 PM  Staffing  Performed: anesthesiologist   Anesthesiologist: Jeni Severin, MD  Preanesthetic Checklist  Completed: patient identified, IV checked, site marked, risks and benefits discussed, surgical/procedural consents, equipment checked, pre-op evaluation, timeout performed, anesthesia consent given, oxygen available and monitors applied/VS acknowledged  Peripheral Block   Patient position: sitting  Prep: ChloraPrep  Provider prep: mask and sterile gloves (Sterile probe cover)  Patient monitoring: cardiac monitor, continuous pulse ox, frequent blood pressure checks and IV access  Block type: Erector spinae  Laterality: bilateral  Injection technique: single-shot  Guidance: ultrasound guided  Local infiltration: bupivacaine (20 cc of NS added)  Infiltration strength: 0.5 %  Local infiltration: bupivacaine (20 cc of NS added)  Dose: 30 mL    Needle   Needle type: combined needle/nerve stimulator   Needle gauge: 21 G  Needle localization: anatomical landmarks and ultrasound guidance  Needle length: 10 cm  Assessment   Injection assessment: negative aspiration for heme, no paresthesia on injection and local visualized surrounding nerve on ultrasound  Paresthesia pain: immediately resolved  Slow fractionated injection: yes  Hemodynamics: stable  Real-time US image taken/store: yes    Additional Notes  Ultrasound image printed and saved in patient chart.     Sterile probe cover used

## 2023-02-28 NOTE — ED NOTES
Pt is satisifed with her pain level at this time.  No acute distress noted.  Resps even non labored.  Skin p/w/d.  Lights off, pt comfortable.  Call light in reach.  Denies further needs     Chrystal Terrell RN  02/28/23 1264

## 2023-02-28 NOTE — ED NOTES
Pt stable, resting in bed, a&ox4, skin w/d/pink, 0 distress, 0 n&v, will monitor.      Keven Kaye RN  02/28/23 5219

## 2023-02-28 NOTE — H&P
EMERGENCY GENERAL SURGERY CONSULTATION    Reason for consultation:  cholecystitis    HPI:  Pt is 26yo F who presented with RUQ pain after eating. Pain is sharp, severe, constant with associated N/V.  ED w/u included CT scan which showed gall stones and sings of cholecystitis. She has had similar episodes in the past.  Denies fevers/chills. Gen surg consulted for cholecystitis. PMH:    Past Medical History:   Diagnosis Date    Anxiety     Asthma     Bipolar depression ([address]     Migraines        PSH:    History reviewed. No pertinent surgical history. FH:    Family History   Problem Relation Age of Onset    Mental Illness Mother     Anxiety Disorder Mother     Schizophrenia Mother     Cancer Father         throat cancer    Allergies Brother       No known h/o bleeding or clotting disorders    SH:    Social History     Tobacco Use    Smoking status: Former     Packs/day: 0.25     Types: Cigarettes     Start date: 2014     Quit date: 2021     Years since quittin.4    Smokeless tobacco: Never   Substance Use Topics    Alcohol use: Never    Drug use: Never       Home Medications:  No current facility-administered medications on file prior to encounter.      Current Outpatient Medications on File Prior to Encounter   Medication Sig Dispense Refill    PROAIR  (90 Base) MCG/ACT inhaler INHALE 2 PUFFS into the lungs FOUR TIMES DAILY AS NEEDED FOR WHEEZING 8.5 g 2    pantoprazole (PROTONIX) 40 MG tablet TAKE 1 TABLET BY MOUTH DAILY 30 tablet 2    tiZANidine (ZANAFLEX) 4 MG tablet TAKE 1 TABLET BY MOUTH EVERY 8 HOURS AS NEEDED for spasms 60 tablet 2    topiramate (TOPAMAX) 100 MG tablet TAKE 1 TABLET BY MOUTH TWICE DAILY 60 tablet 0    busPIRone (BUSPAR) 10 MG tablet Take 1 tablet by mouth twice a day with meals 60 tablet 5    cetirizine-psuedoephedrine (ZYRTEC-D) 5-120 MG per extended release tablet TAKE 1 TABLET BY MOUTH TWICE DAILY 60 tablet 2    montelukast (SINGULAIR) 10 MG tablet Take 1 tablet by mouth nightly 90 tablet 1    vitamin D (ERGOCALCIFEROL) 1.25 MG (66023 UT) CAPS capsule TAKE 1 CAPSULE BY MOUTH ONCE A WEEK 12 capsule 1    FEROSUL 325 (65 Fe) MG tablet Take 1 tablet by mouth daily (with breakfast) 90 tablet 1    methylPREDNISolone (MEDROL, JEZ,) 4 MG tablet Take by mouth. 1 kit 0    diclofenac sodium (VOLTAREN) 1 % GEL Apply 2 g topically 4 times daily 100 g 0    triamcinolone (KENALOG) 0.1 % cream Apply topically 2 times daily. 45 g 1    NURTEC 75 MG TBDP Dissolve 1 tablet in the mouth daily as needed (migraine) 15 tablet 1    ASMANEX, 30 METERED DOSES, 220 MCG/INH inhaler INHALE 1 PUFF into the lungs DAILY 1 Inhaler 5    hydrOXYzine (VISTARIL) 25 MG capsule TAKE 1 CAPSULE BY MOUTH FOUR TIMES DAILY AS NEEDED FOR ANXIETY      doxepin (SINEQUAN) 10 MG capsule TAKE 2 CAPSULES BY MOUTH AT BEDTIME. TAKE 1&1/2 HOURS before bedtime. naratriptan (AMERGE) 2.5 MG tablet Take 1 tablet by mouth once as needed for Migraine 2.5 mg at onset of headache, may repeat in 4 hours if needed 9 tablet 3    LATUDA 60 MG TABS tablet Take 1 tablet by mouth every evening Take with at least 350 calories. AIMOVIG 140 MG/ML SOAJ Inject 1 mL into the skin every 30 days      cetirizine (ZYRTEC) 10 MG tablet Take 1 tablet by mouth daily 90 tablet 2    ondansetron (ZOFRAN-ODT) 4 MG disintegrating tablet DISSOLVE 1 (ONE) TABLET BY MOUTH EVERY 8 HOURS         Review Of Systems:   Review of Systems   Constitutional:  Positive for appetite change. Negative for chills and fever. HENT: Negative. Eyes: Negative. Respiratory: Negative. Cardiovascular: Negative. Gastrointestinal:  Positive for abdominal pain, nausea and vomiting. Endocrine: Negative. Genitourinary: Negative. Musculoskeletal: Negative. Skin: Negative. Neurological: Negative. Hematological: Negative.       PHYSICAL EXAM:      VITALS:  General Appearance: NAD, AOx3   Skin: Warm, pink, dry   HEENT: NCAT, aniceteric   Neck: Supple, full ROM   Lymph Nodes: n/a   Cardiovascular: RRR   Lungs: non-labored respirations   Breast/Chest: No deformity   Abdomen: Soft, TTP in RUQ   Genitourinary: deferred   Extremities: No deformity   Neurologic: AOx3, cooperative, FARNSWORTH to command, no focal deficits     LABS:      BASIC LABS:  CBC:   Lab Results   Component Value Date/Time    WBC 12.2 02/28/2023 07:43 AM    RBC 4.80 02/28/2023 07:43 AM    HGB 13.5 02/28/2023 07:43 AM    HCT 40.2 02/28/2023 07:43 AM    MCV 83.8 02/28/2023 07:43 AM    MCH 28.1 02/28/2023 07:43 AM    MCHC 33.5 02/28/2023 07:43 AM    RDW 15.0 02/28/2023 07:43 AM     02/28/2023 07:43 AM    MPV 8.6 03/14/2014 10:04 AM     CBC with Differential:    Lab Results   Component Value Date/Time    WBC 12.2 02/28/2023 07:43 AM    RBC 4.80 02/28/2023 07:43 AM    HGB 13.5 02/28/2023 07:43 AM    HCT 40.2 02/28/2023 07:43 AM     02/28/2023 07:43 AM    MCV 83.8 02/28/2023 07:43 AM    MCH 28.1 02/28/2023 07:43 AM    MCHC 33.5 02/28/2023 07:43 AM    RDW 15.0 02/28/2023 07:43 AM    LYMPHOPCT 17.2 02/27/2023 10:15 PM    MONOPCT 3.9 02/27/2023 10:15 PM    BASOPCT 0.6 02/27/2023 10:15 PM    MONOSABS 0.4 02/27/2023 10:15 PM    LYMPHSABS 1.9 02/27/2023 10:15 PM    EOSABS 0.2 02/27/2023 10:15 PM    BASOSABS 0.1 02/27/2023 10:15 PM     CMP:    Lab Results   Component Value Date/Time     02/28/2023 07:43 AM    K 4.0 02/28/2023 07:43 AM     02/28/2023 07:43 AM    CO2 24 02/28/2023 07:43 AM    BUN 8 02/28/2023 07:43 AM    CREATININE 0.57 02/28/2023 07:43 AM    GFRAA >60.0 04/22/2021 03:11 PM    LABGLOM >60.0 02/28/2023 07:43 AM    GLUCOSE 125 02/28/2023 07:43 AM    GLUCOSE 112 06/24/2020 10:30 PM    PROT 6.5 02/28/2023 07:43 AM    LABALBU 3.6 02/28/2023 07:43 AM    LABALBU 4.0 06/24/2020 10:30 PM    CALCIUM 8.1 02/28/2023 07:43 AM    BILITOT 0.4 02/28/2023 07:43 AM    ALKPHOS 64 02/28/2023 07:43 AM    AST 19 02/28/2023 07:43 AM    ALT 20 02/28/2023 07:43 AM     BMP:    Lab Results   Component Value Date/Time     02/28/2023 07:43 AM    K 4.0 02/28/2023 07:43 AM     02/28/2023 07:43 AM    CO2 24 02/28/2023 07:43 AM    BUN 8 02/28/2023 07:43 AM    LABALBU 3.6 02/28/2023 07:43 AM    LABALBU 4.0 06/24/2020 10:30 PM    CREATININE 0.57 02/28/2023 07:43 AM    CALCIUM 8.1 02/28/2023 07:43 AM    GFRAA >60.0 04/22/2021 03:11 PM    LABGLOM >60.0 02/28/2023 07:43 AM    GLUCOSE 125 02/28/2023 07:43 AM    GLUCOSE 112 06/24/2020 10:30 PM     Magnesium:  Lab Results   Component Value Date/Time    MG 1.9 02/28/2023 07:43 AM     Troponin:    Lab Results   Component Value Date/Time    TROPONINI <0.02 06/24/2020 10:30 PM       IMAGING:    CT scan a/p: gall stones with evidence of cholecystitis    ASSESSMENT/RECOMMENDATIONS:    Pt is 26yo F with acute cholecystitis;   will admit and plan for OR for cholecystectomy; NPO, ivf, abx, pain control    Raudel Lombardi MD

## 2023-02-28 NOTE — ED NOTES
Pt c/o 8/10 right flank pain. Pt states she still feels nauseous. Emesis bag given 1 episode of yellow emesis at this time. This nurse told pt her next pain medication/nausea medication isn't due until 7am and she will be back with it when it is due. Pt is cooperative at this time. Denies any other needs. Call light in reach.      Gilberto Fisher RN  02/28/23 0164

## 2023-02-28 NOTE — ED NOTES
Patient resting in bed at this time with eyes closed at this time     Macey Martinez [facility]  02/28/23 5777

## 2023-02-28 NOTE — ANESTHESIA POSTPROCEDURE EVALUATION
Department of Anesthesiology  Postprocedure Note    Patient: Yuliya Arroyo  MRN: [id number]  YOB: 1995  Date of evaluation: 2/28/2023      Procedure Summary     Date: 02/28/23 Room / Location: [address] [place] [address]    Anesthesia Start: 5195 Anesthesia Stop: 2630    Procedure: LAPAROSCOPIC CHOLECYSTECTOMY (Abdomen) Diagnosis:       Acute cholecystitis      (ACUTE CHOLECYSTITIS)    Surgeons: Raghav Fox MD Responsible Provider: Denver Candelario MD    Anesthesia Type: general, regional ASA Status: 3          Anesthesia Type: No value filed.     Sophia Phase I: Sophia Score: 10    Sophia Phase II:        Anesthesia Post Evaluation    Patient location during evaluation: PACU  Patient participation: complete - patient participated  Level of consciousness: awake  Airway patency: patent  Nausea & Vomiting: no nausea and no vomiting  Complications: no  Cardiovascular status: hemodynamically stable  Respiratory status: acceptable  Hydration status: stable

## 2023-02-28 NOTE — ED NOTES
Pts vitals were taken and as noted, pt was given warm blankets and helped change position for comfort. Pt medication was started. Pt was advised that she has approx 1 hour before more pain medication can be given last dose at 0108. Pt states understanding and comfort measures were done to help with pt pain. Room lights were turned off and curtain was closed to assist pt in rest. Pt was offered restroom and refused at this time. Pt has no questions and is resting at this time. Pt call light is at her side.       Kurt Pastrana RN  02/28/23 0487

## 2023-02-28 NOTE — ANESTHESIA PRE PROCEDURE
Department of Anesthesiology  Preprocedure Note       Name:  Micki Archibald   Age:  33 y.o.  :  1995                                          MRN:  [id number]         Date:  2023      Surgeon: Stefani Wright):  Brad Shipley MD    Procedure: Procedure(s):  LAPAROSCOPIC CHOLECYSTECTOMY ROOM ER 12    Medications prior to admission:   Prior to Admission medications    Medication Sig Start Date End Date Taking? Authorizing Provider   PROAIR  (73 Base) MCG/ACT inhaler INHALE 2 PUFFS into the lungs FOUR TIMES DAILY AS NEEDED FOR WHEEZING 22   KENNETH Valentin CNP   pantoprazole (PROTONIX) 40 MG tablet TAKE 1 TABLET BY MOUTH DAILY 22   KENNETH Valentin CNP   tiZANidine (ZANAFLEX) 4 MG tablet TAKE 1 TABLET BY MOUTH EVERY 8 HOURS AS NEEDED for spasms 22   KENNETH Valentin CNP   topiramate (TOPAMAX) 100 MG tablet TAKE 1 TABLET BY MOUTH TWICE DAILY 22   KENNETH Valentin CNP   busPIRone (BUSPAR) 10 MG tablet Take 1 tablet by mouth twice a day with meals 22   KENNETH Valentin CNP   cetirizine-psuedoephedrine (ZYRTEC-D) 5-120 MG per extended release tablet TAKE 1 TABLET BY MOUTH TWICE DAILY 22   KENNETH Valentin CNP   montelukast (SINGULAIR) 10 MG tablet Take 1 tablet by mouth nightly 22   KENNETH Valentin CNP   vitamin D (ERGOCALCIFEROL) 1.25 MG (12036 UT) CAPS capsule TAKE 1 CAPSULE BY MOUTH ONCE A WEEK 22   KENNETH Valentin CNP   FEROSUL 325 (65 Fe) MG tablet Take 1 tablet by mouth daily (with breakfast) 10/26/21   KENNETH Valentin CNP   methylPREDNISolone (MEDROL, JEZ,) 4 MG tablet Take by mouth. 10/4/21   KENNETH Valentin CNP   diclofenac sodium (VOLTAREN) 1 % GEL Apply 2 g topically 4 times daily 10/4/21   KENNETH Valentin CNP   triamcinolone (KENALOG) 0.1 % cream Apply topically 2 times daily.  10/4/21   KENNETH Valentin CNP   NURTEC 75 MG TBDP Dissolve 1 tablet in the mouth daily as needed (migraine) 8/13/21   Chyrel Free, APRN - CNP   ASMANEX, 30 METERED DOSES, 220 MCG/INH inhaler INHALE 1 PUFF into the lungs DAILY 7/12/21   Chyrel Free, APRN - CNP   hydrOXYzine (VISTARIL) 25 MG capsule TAKE 1 CAPSULE BY MOUTH FOUR TIMES DAILY AS NEEDED FOR ANXIETY 4/6/21   Historical Provider, MD   doxepin (SINEQUAN) 10 MG capsule TAKE 2 CAPSULES BY MOUTH AT BEDTIME. TAKE 1&1/2 HOURS before bedtime. 4/6/21   Historical Provider, MD   naratriptan (AMERGE) 2.5 MG tablet Take 1 tablet by mouth once as needed for Migraine 2.5 mg at onset of headache, may repeat in 4 hours if needed 10/19/20 10/4/21  Carlos Walsh MD   LATUDA 60 MG TABS tablet Take 1 tablet by mouth every evening Take with at least 350 calories.  7/15/20   Historical Provider, MD   AIMOVIG 140 MG/ML SOAJ Inject 1 mL into the skin every 30 days 5/26/20   Historical Provider, MD   cetirizine (ZYRTEC) 10 MG tablet Take 1 tablet by mouth daily 4/3/20   Chyrel Free, APRJC - CNP   ondansetron (ZOFRAN-ODT) 4 MG disintegrating tablet DISSOLVE 1 (ONE) TABLET BY MOUTH EVERY 8 HOURS 2/18/20   Historical Provider, MD       Current medications:    Current Facility-Administered Medications   Medication Dose Route Frequency Provider Last Rate Last Admin    sodium chloride flush 0.9 % injection 5-40 mL  5-40 mL IntraVENous 2 times per day MD Ginny        sodium chloride flush 0.9 % injection 5-40 mL  5-40 mL IntraVENous PRN MD Ginny        0.9 % sodium chloride infusion   IntraVENous Continuous MD Ginny 125 mL/hr at 02/28/23 0948 New Bag at 02/28/23 0948    ondansetron (ZOFRAN-ODT) disintegrating tablet 4 mg  4 mg Oral Q8H PRN MD Ginny        Or    ondansetron [facility]) injection 4 mg  4 mg IntraVENous Q6H PRN MD Ginny   4 mg at 02/28/23 0701    oxyCODONE (ROXICODONE) immediate release tablet 5 mg  5 mg Oral Q4H PRN NASIR Lebron        oxyCODONE (ROXICODONE) immediate release tablet 10 mg  10 mg Oral Q4H PRN Penny Nick PA   10 mg at 23 1947    acetaminophen (TYLENOL) tablet 650 mg  650 mg Oral Q6H Rockne Nick, PA   650 mg at 23 1112    HYDROmorphone (DILAUDID) injection 0.5 mg  0.5 mg IntraVENous Q3H PRN Penny Romero, PA           Allergies: Allergies   Allergen Reactions    Aspirin Hives     Oral sores/hives    Vicodin [Hydrocodone-Acetaminophen] Nausea And Vomiting       Problem List:    Patient Active Problem List   Diagnosis Code    Intractable chronic migraine without aura and without status migrainosus G43.719    Morbidly obese (HCC) E66.01    Acute cholecystitis K81.0       Past Medical History:        Diagnosis Date    Anxiety     Asthma     Bipolar depression ([facility] [address]     Migraines        Past Surgical History:  History reviewed. No pertinent surgical history.     Social History:    Social History     Tobacco Use    Smoking status: Former     Packs/day: 0.25     Types: Cigarettes     Start date: 2014     Quit date: 2021     Years since quittin.4    Smokeless tobacco: Never   Substance Use Topics    Alcohol use: Never                                Counseling given: Not Answered      Vital Signs (Current):   Vitals:    23 0615 23 0815 23 1100 23 1230   BP: (!) 116/95 122/88 125/82 (!) 133/90   Pulse: 67 84 92 95   Resp: 18 17 17 17   Temp:   98.1 °F (36.7 °C)    TempSrc:   Oral    SpO2: 97% 95% 96% 99%   Weight:       Height:                                                  BP Readings from Last 3 Encounters:   23 (!) 133/90   22 134/83   22 114/77       NPO Status: Time of last liquid consumption: 0000                        Time of last solid consumption: 0000                        Date of last liquid consumption: 23                        Date of last solid food consumption: 23    BMI:   Wt Readings from Last 3 Encounters:   23 200 lb (90.7 kg)   22 203 lb (92.1 kg)   10/04/21 215 lb (97.5 kg)     Body mass index is 30.41 kg/m². CBC:   Lab Results   Component Value Date/Time    WBC 12.2 02/28/2023 07:43 AM    RBC 4.80 02/28/2023 07:43 AM    HGB 13.5 02/28/2023 07:43 AM    HCT 40.2 02/28/2023 07:43 AM    MCV 83.8 02/28/2023 07:43 AM    RDW 15.0 02/28/2023 07:43 AM     02/28/2023 07:43 AM       CMP:   Lab Results   Component Value Date/Time     02/28/2023 07:43 AM    K 4.0 02/28/2023 07:43 AM     02/28/2023 07:43 AM    CO2 24 02/28/2023 07:43 AM    BUN 8 02/28/2023 07:43 AM    CREATININE 0.57 02/28/2023 07:43 AM    GFRAA >60.0 04/22/2021 03:11 PM    LABGLOM >60.0 02/28/2023 07:43 AM    GLUCOSE 125 02/28/2023 07:43 AM    GLUCOSE 112 06/24/2020 10:30 PM    PROT 6.5 02/28/2023 07:43 AM    CALCIUM 8.1 02/28/2023 07:43 AM    BILITOT 0.4 02/28/2023 07:43 AM    ALKPHOS 64 02/28/2023 07:43 AM    AST 19 02/28/2023 07:43 AM    ALT 20 02/28/2023 07:43 AM       POC Tests: No results for input(s): POCGLU, POCNA, POCK, POCCL, POCBUN, POCHEMO, POCHCT in the last 72 hours.     Coags:   Lab Results   Component Value Date/Time    PROTIME 12.0 06/24/2020 10:30 PM    INR 1.0 06/24/2020 10:30 PM    APTT 30 06/24/2020 10:30 PM       HCG (If Applicable):   Lab Results   Component Value Date    PREGTESTUR Negative 02/27/2023        ABGs: No results found for: PHART, PO2ART, UUV1ULM, UIJ5XVV, BEART, R2YBKYIN     Type & Screen (If Applicable):  No results found for: LABABO, LABRH    Drug/Infectious Status (If Applicable):  No results found for: HIV, HEPCAB    COVID-19 Screening (If Applicable): No results found for: COVID19        Anesthesia Evaluation    Airway: Mallampati: II  TM distance: >3 FB   Neck ROM: full  Mouth opening: > = 3 FB   Dental: normal exam         Pulmonary: breath sounds clear to auscultation  (+) sleep apnea:  asthma:                            Cardiovascular:Negative CV ROS            Rhythm: regular                      Neuro/Psych:   (+) headaches: migraine headaches, psychiatric history:            GI/Hepatic/Renal: Neg GI/Hepatic/Renal ROS            Endo/Other: Negative Endo/Other ROS                    Abdominal:             Vascular: negative vascular ROS. Other Findings:           Anesthesia Plan      general and regional     ASA 3     (US guided NIKITA block)  Induction: intravenous. MIPS: Prophylactic antiemetics administered. Anesthetic plan and risks discussed with patient. Use of blood products discussed with patient whom consented to blood products.    Plan discussed with CRNA and surgical team.    Attending anesthesiologist reviewed and agrees with Preprocedure content      Post-op pain plan if not by surgeon: single peripheral nerve block            Ashanti Guzman MD   2/28/2023

## 2023-02-28 NOTE — BRIEF OP NOTE
Brief Postoperative Note      Patient: Fariba Asa  YOB: 1995  MRN: [id number]    Date of Procedure: 2/28/2023    Pre-Op Diagnosis: ACUTE CHOLECYSTITIS    Post-Op Diagnosis: Same       Procedure(s):  LAPAROSCOPIC CHOLECYSTECTOMY    Surgeon(s):  Param Burger MD    Assistant:  Physician Assistant: NASIR Lebron    Anesthesia: General    Estimated Blood Loss (mL): Minimal    Complications: None    Specimens:   ID Type Source Tests Collected by Time Destination   A : Gallbladder Tissue Gallbladder SURGICAL PATHOLOGY Param Burger MD 2/28/2023 1511        Implants:  * No implants in log *      Drains: * No LDAs found *    Findings: acutely inflamed gall bladder    Electronically signed by Param Burger MD on 2/28/2023 at 3:46 PM

## 2023-02-28 NOTE — OP NOTE
Operative Note      Patient: Yuliya Arroyo  YOB: 1995  MRN: [id number]    Date of Procedure: 2/28/2023    Pre-Op Diagnosis: ACUTE CHOLECYSTITIS    Post-Op Diagnosis: Same       Procedure(s):  LAPAROSCOPIC CHOLECYSTECTOMY    Surgeon(s):  Raghav Fox MD    Assistant:   Physician Assistant: NASIR Carbone    Anesthesia: General    Estimated Blood Loss (mL): Minimal    Complications: None    Specimens:   ID Type Source Tests Collected by Time Destination   A : Gallbladder Tissue Gallbladder SURGICAL PATHOLOGY Raghav Fox MD 2/28/2023 1511        Implants:  * No implants in log *      Drains: * No LDAs found *    Findings: acutely inflamed gall bladder    Detailed Description of Procedure: The patient was taken to the operating room after informed consent was obtained. The patient was placed in the supine position, and general endotracheal anesthesia was induced. All necessary support lines were placed. SCDs were on and functional prior to induction. The patient was given perioperative antibiotic prophylaxis. A time out was performed verifying the correct patient, procedure, site, positioning, and special equipment needed prior to the beginning of the procedure. The abdomen was prepped and draped in the usual sterile fashion. Due to body habitus as well as concern for patent umbilical vein, a LUQ entry was chosen. A 5mm incision was made at Salas's point in the LUQ, veress needle inserted, drop test confirmed peritoneal entry. The abdomen was insufflated to a pressure of 15mm Hg. A 5mm scope was inserted with the opitcal trocar confirming intraperitoneal placement. The patient tolerated the insufflation well. The laparoscope was inserted and the abdomen inspected. No injuries from initial trocar placement were noted. The patient was then positioned in reverse Trendelenburg position with right side up.  Two additional 5mm ports were placed under direct visualization in the right upper quadrant and the midclavicular line and anterior axillary line. A ~1cm transverse infraumbilical incision was made and a 10mm port was placed under direct visualization. There were multiple adhesions between the omentum and gallbladder and liver. These were bluntly dissected. The gall bladder was very distended with hydrops and needle decompressed to aid in retraction. The dome of the gallbladder was then grasped with an atraumatic grasper passed through the lateral port and retracted over the dome of the liver. The infundibulum was also grasped with an atraumatic grasper through the midclavicular port and retracted towards the right lower quadrant. This maneuver exposed Calot's triangle. The lateral and medial peritoneal attachments were dissected free. The peritoneum overlying the gallbladder infundibulum was then dissected and the infundibulum was elevated off the cystic plate. The cystic artery and duct were then clearly identified as the only two structures entering the gall bladder thus achieving our critical view of safety. The cystic duct and cystic artery were further circumferentially dissected for adequate clip placement. The cystic duct and cystic artery were then doubly clipped on the stay sided and clipped again on the specimen side. These were then divided close to the gallbladder. The gallbladder was then dissected from its peritoneal attachments by electrocautery. Hemostasis was verified and the gallbladder was removed using an endoscopic retrieval bag placed through the umbilical port. The gallbladder fossa was irrigated. There was no evidence of bleeding from the gallbladder aida or cystic artery. Clips were securely in place across the cystic duct. The trocars were removed under direct visualization. The laparoscope was withdrawn and the umbilical trocar removed. The abdomen was allowed to collapse.  The fascia at the infraumbilical incision was closed with the previously placed anchoring sutures as well as an additional 0-vicryl figure of eight suture. The skin was closed with subcuticular sutures of 4-0 Monocryl. They were dressed with Dermabond. Sponge, instrument and needle counts were correct at the end of the case. The patient tolerated the procedure well and was transported to the PACU in good condition. I was scrubbed for the critical portion of the procedure and immediately available for the non-critical portions.        Electronically signed by Amina Curry MD on 2/28/2023 at 3:47 PM

## 2023-02-28 NOTE — ED NOTES
Pt is ambulating to the bathroom well without assistance at this time. Pt states \"I know you don't really want to hear this, but every time I throw up or gag I pee my pants a little bit. Is there a way I can get a bag for my pants? \"  This nurse gave pt a bag for her clothing.      Isabel Lowe RN  02/28/23 4710

## 2023-02-28 NOTE — ED PROVIDER NOTES
[address] [facility] ED  eMERGENCY dEPARTMENT eNCOUnter      Pt Name: Wilberto Hoyt  MRN: [id number]  Ismael 1995  Date of evaluation: 2/27/2023  Provider: Ananya Galloway PA-C    CHIEF COMPLAINT       Chief Complaint   Patient presents with    Abdominal Pain     Abdominal pain x2 hours, nausea         HISTORY OF PRESENT ILLNESS   (Location/Symptom, Timing/Onset,Context/Setting, Quality, Duration, Modifying Factors, Severity)  Note limiting factors. Wilberto Hoyt is a 33 y.o. female who presents to the emergency department   presents with upper right abdominal pain with back pain started 2 hours ago with nauseousness and vomiting after eating grilled chicken remain mac & cheese and baked beans patient has known history of cholelithiasis denies fever chills rectal bleeding. Symptoms moderate severity worse with touch or motion nothing improves symptoms. Patient notes she has not followed up for endoscopy. We discussed the importance of endoscopy    HPI    NursingNotes were reviewed. REVIEW OF SYSTEMS    (2-9 systems for level 4, 10 or more for level 5)     Review of Systems   Constitutional:  Negative for activity change, appetite change, fever and unexpected weight change. HENT:  Negative for ear discharge, nosebleeds and voice change. Eyes:  Negative for discharge. Respiratory:  Negative for apnea and cough. Cardiovascular:  Negative for chest pain. Gastrointestinal:  Positive for abdominal pain, nausea and vomiting. Negative for abdominal distention and anal bleeding. Genitourinary:  Negative for dysuria and hematuria. Musculoskeletal:  Positive for back pain. Negative for joint swelling. Skin:  Negative for pallor. Neurological:  Negative for seizures and facial asymmetry. Psychiatric/Behavioral:  Negative for behavioral problems, self-injury and sleep disturbance. All other systems reviewed and are negative.     Except as noted above the remainder of the review of systems was reviewed and negative. PAST MEDICAL HISTORY     Past Medical History:   Diagnosis Date    Anxiety     Asthma     Bipolar depression ([facility] [address]     Migraines          SURGICALHISTORY     History reviewed. No pertinent surgical history. CURRENT MEDICATIONS       Previous Medications    AIMOVIG 140 MG/ML SOAJ    Inject 1 mL into the skin every 30 days    ASMANEX, 30 METERED DOSES, 220 MCG/INH INHALER    INHALE 1 PUFF into the lungs DAILY    BUSPIRONE (BUSPAR) 10 MG TABLET    Take 1 tablet by mouth twice a day with meals    CETIRIZINE (ZYRTEC) 10 MG TABLET    Take 1 tablet by mouth daily    CETIRIZINE-PSUEDOEPHEDRINE (ZYRTEC-D) 5-120 MG PER EXTENDED RELEASE TABLET    TAKE 1 TABLET BY MOUTH TWICE DAILY    DICLOFENAC SODIUM (VOLTAREN) 1 % GEL    Apply 2 g topically 4 times daily    DOXEPIN (SINEQUAN) 10 MG CAPSULE    TAKE 2 CAPSULES BY MOUTH AT BEDTIME. TAKE 1&1/2 HOURS before bedtime. FEROSUL 325 (65 FE) MG TABLET    Take 1 tablet by mouth daily (with breakfast)    HYDROXYZINE (VISTARIL) 25 MG CAPSULE    TAKE 1 CAPSULE BY MOUTH FOUR TIMES DAILY AS NEEDED FOR ANXIETY    LATUDA 60 MG TABS TABLET    Take 1 tablet by mouth every evening Take with at least 350 calories. METHYLPREDNISOLONE (MEDROL, JEZ,) 4 MG TABLET    Take by mouth.     MONTELUKAST (SINGULAIR) 10 MG TABLET    Take 1 tablet by mouth nightly    NARATRIPTAN (AMERGE) 2.5 MG TABLET    Take 1 tablet by mouth once as needed for Migraine 2.5 mg at onset of headache, may repeat in 4 hours if needed    NURTEC 75 MG TBDP    Dissolve 1 tablet in the mouth daily as needed (migraine)    ONDANSETRON (ZOFRAN-ODT) 4 MG DISINTEGRATING TABLET    DISSOLVE 1 (ONE) TABLET BY MOUTH EVERY 8 HOURS    PANTOPRAZOLE (PROTONIX) 40 MG TABLET    TAKE 1 TABLET BY MOUTH DAILY    PROAIR  (90 BASE) MCG/ACT INHALER    INHALE 2 PUFFS into the lungs FOUR TIMES DAILY AS NEEDED FOR WHEEZING    TIZANIDINE (ZANAFLEX) 4 MG TABLET    TAKE 1 TABLET BY MOUTH EVERY 8 HOURS AS NEEDED for spasms    TOPIRAMATE (TOPAMAX) 100 MG TABLET    TAKE 1 TABLET BY MOUTH TWICE DAILY    TRIAMCINOLONE (KENALOG) 0.1 % CREAM    Apply topically 2 times daily. VITAMIN D (ERGOCALCIFEROL) 1.25 MG (90497 UT) CAPS CAPSULE    TAKE 1 CAPSULE BY MOUTH ONCE A WEEK            Aspirin and Vicodin [hydrocodone-acetaminophen]    FAMILY HISTORY       Family History   Problem Relation Age of Onset    Mental Illness Mother     Anxiety Disorder Mother     Schizophrenia Mother     Cancer Father         throat cancer    Allergies Brother           SOCIAL HISTORY       Social History     Socioeconomic History    Marital status: Single     Spouse name: None    Number of children: None    Years of education: None    Highest education level: None   Tobacco Use    Smoking status: Former     Packs/day: 0.25     Types: Cigarettes     Start date: 2014     Quit date: 2021     Years since quittin.4    Smokeless tobacco: Never   Substance and Sexual Activity    Alcohol use: Never    Drug use: Never    Sexual activity: Yes     Partners: Male       SCREENINGS   Jorge Alberto Coma Scale  Eye Opening: Spontaneous  Best Verbal Response: Oriented  Best Motor Response: Obeys commands  Jorge Alberto Coma Scale Score: 15  Ebola Virus Disease (EVD) Screening   Temp: 97.7 °F (36.5 °C)  CIWA Assessment  BP: 132/76  Heart Rate: 74    PHYSICAL EXAM    (up to 7 for level 4, 8 or more for level 5)     ED Triage Vitals   BP Temp Temp Source Heart Rate Resp SpO2 Height Weight   23 2215 23 2152 23 2152 23 2152 23 2152 23 2152 23 21523 215   (!) 143/84 97.7 °F (36.5 °C) Oral 60 18 99 % 5' 8\" (1.727 m) 200 lb (90.7 kg)       Physical Exam  Vitals and nursing note reviewed. Constitutional:       General: She is not in acute distress. Appearance: She is well-developed. HENT:      Head: Normocephalic and atraumatic.       Right Ear: External ear normal.      Left Ear: External ear normal.      Nose: Nose normal.      Mouth/Throat:      Mouth: Mucous membranes are moist.   Eyes:      General:         Right eye: No discharge. Left eye: No discharge. Pupils: Pupils are equal, round, and reactive to light. Cardiovascular:      Rate and Rhythm: Normal rate and regular rhythm. Pulses: Normal pulses. Heart sounds: Normal heart sounds. Pulmonary:      Effort: Pulmonary effort is normal. No respiratory distress. Breath sounds: Normal breath sounds. No stridor. Abdominal:      General: Bowel sounds are normal. There is no distension. Palpations: Abdomen is soft. Tenderness: There is abdominal tenderness in the right upper quadrant and right lower quadrant. There is right CVA tenderness. Musculoskeletal:         General: Normal range of motion. Cervical back: Normal range of motion and neck supple. Skin:     General: Skin is warm. Findings: No erythema. Neurological:      Mental Status: She is alert and oriented to person, place, and time.       Gait: Gait normal.   Psychiatric:         Mood and Affect: Mood normal.       RESULTS     EKG: All EKG's are interpreted by the Emergency Department Physician who either signs or Co-signsthis chart in the absence of a cardiologist.         RADIOLOGY:   Randi Tommy such as CT, Ultrasound and MRI are read by the radiologist. Plain radiographic images are visualized and preliminarily interpreted by the emergency physician with the below findings:         Interpretation per the Radiologist below, if available at the time ofthis note:    CT ABDOMEN PELVIS W IV CONTRAST Additional Contrast? None    (Results Pending)         ED BEDSIDE ULTRASOUND:   Performed by ED Physician - none    LABS:  Labs Reviewed   CBC WITH AUTO DIFFERENTIAL - Abnormal; Notable for the following components:       Result Value    WBC 11.2 (*)     RDW 14.9 (*)     Neutrophils Absolute 8.6 (*)     All other components within normal limits COMPREHENSIVE METABOLIC PANEL - Abnormal; Notable for the following components:    Glucose 121 (*)     All other components within normal limits   URINE DRUG SCREEN - Abnormal; Notable for the following components:    Cannabinoid Scrn, Ur POSITIVE (*)     All other components within normal limits   POCT CREATININE - Normal   LIPASE   URINALYSIS WITH REFLEX TO CULTURE   PREGNANCY, URINE       All other labs were within normal range or not returned as of this dictation. EMERGENCY DEPARTMENT COURSE and DIFFERENTIAL DIAGNOSIS/MDM:   Vitals:    Vitals:    02/27/23 2215 02/27/23 2230 02/27/23 2300 02/28/23 0000   BP: (!) 143/84 135/74  132/76   Pulse:    74   Resp:    19   Temp:       TempSrc:       SpO2:  97% 98% 98%   Weight:       Height:                MDM  Number of Diagnoses or Management Options  Calculus of gallbladder with acute cholecystitis without obstruction  Nausea and vomiting, unspecified vomiting type  Diagnosis management comments: Viewed outside records from [address] [place] [address] ER visit [address]  clinic patient noted have cholelithiasis. Discussed with patient at length with upper abdominal pain we will add CT rule out pancreatitis she also has right lower quadrant tenderness as well as right upper quadrant tenderness known history of cholelithiasis on records. We will add fluids medications for pain nausea vomiting cramping CBC CMP lipase urine. We discussed at length patient to avoid fried buttered creamy or lipid containing foods follow-up with primary care and general surgeon if this becomes more frequent or discuss elective cholecystectomy. Differential diagnosis includes cholelithiasis cholecystitis pancreatitis enteritis.     Preliminary CT report gallbladder distended with small stones wall thickening suspicious for acute cholecystitis discussed with Dr. Cross Alpha surgeon on-call he recommends admission but if patient wants discharged home we could add 5 days of antibiotics and medication and follow-up as an outpatient discussed with patient at length she and her family at bedside decided that she will stay we discussed that they would admit to Dr. Simuel Prader service and consider cholecystectomy in 1 to 2 days after the gallbladder cools down. Amount and/or Complexity of Data Reviewed  Clinical lab tests: reviewed and ordered  Tests in the radiology section of CPT®: ordered and reviewed  Review and summarize past medical records: yes        CRITICAL CARE TIME     CONSULTS:  None    PROCEDURES:  Unless otherwise noted below, none     Procedures    FINAL IMPRESSION      1. Calculus of gallbladder with acute cholecystitis without obstruction    2. Nausea and vomiting, unspecified vomiting type          DISPOSITION/PLAN   DISPOSITION Admitted 02/28/2023 12:53:58 AM      PATIENT REFERRED TO:  No follow-up provider specified.     DISCHARGE MEDICATIONS:  New Prescriptions    No medications on file          (Please note that portions of this note were completed with a voice recognition program.  Efforts were made to edit the dictations but occasionally words are mis-transcribed.)    Prashant Gilmore PA-C (electronically signed)  Attending Emergency Physician        Prashant Gilmore PA-C  02/28/23 0100

## 2023-02-28 NOTE — ED TRIAGE NOTES
Pt to ED due to abdominal pain x2 hours. Pt states she feels nauseous. Pt is alert and oriented x4. Skin is warm, dry, intact.  Resp are regular and equal.

## 2023-02-28 NOTE — ED NOTES
Patient moved to room 18, transferred care to Evgeny LAGOS at this time with report.      Belem Mccollum RN  02/28/23 6685

## 2023-02-28 NOTE — ED NOTES
patient would like more pain medication, aware that she had Dilaudid at 0700 and it is every 3 hours.       Elisa Johansen RN  02/28/23 1401

## 2023-03-01 VITALS
TEMPERATURE: 97.9 F | HEIGHT: 68 IN | OXYGEN SATURATION: 100 % | DIASTOLIC BLOOD PRESSURE: 60 MMHG | RESPIRATION RATE: 16 BRPM | WEIGHT: 200 LBS | BODY MASS INDEX: 30.31 KG/M2 | SYSTOLIC BLOOD PRESSURE: 123 MMHG | HEART RATE: 82 BPM

## 2023-03-01 PROBLEM — K76.0 HEPATIC STEATOSIS: Status: ACTIVE | Noted: 2023-03-01

## 2023-03-01 PROBLEM — G89.18 ACUTE POST-OPERATIVE PAIN: Status: ACTIVE | Noted: 2023-03-01

## 2023-03-01 PROCEDURE — 2580000003 HC RX 258: Performed by: SURGERY

## 2023-03-01 PROCEDURE — 6370000000 HC RX 637 (ALT 250 FOR IP): Performed by: PHYSICIAN ASSISTANT

## 2023-03-01 PROCEDURE — 6360000002 HC RX W HCPCS: Performed by: PHYSICIAN ASSISTANT

## 2023-03-01 PROCEDURE — 2580000003 HC RX 258: Performed by: PHYSICIAN ASSISTANT

## 2023-03-01 RX ORDER — SENNA AND DOCUSATE SODIUM 50; 8.6 MG/1; MG/1
1 TABLET, FILM COATED ORAL 2 TIMES DAILY
Qty: 28 TABLET | Refills: 0 | Status: SHIPPED | OUTPATIENT
Start: 2023-03-01 | End: 2023-03-15

## 2023-03-01 RX ORDER — ACETAMINOPHEN 325 MG/1
650 TABLET ORAL EVERY 6 HOURS
Qty: 112 TABLET | Refills: 0 | Status: SHIPPED | OUTPATIENT
Start: 2023-03-01 | End: 2023-03-15

## 2023-03-01 RX ORDER — OXYCODONE HYDROCHLORIDE 5 MG/1
5 TABLET ORAL EVERY 6 HOURS PRN
Qty: 16 TABLET | Refills: 0 | Status: SHIPPED | OUTPATIENT
Start: 2023-03-01 | End: 2023-03-05

## 2023-03-01 RX ADMIN — Medication 10 ML: at 10:12

## 2023-03-01 RX ADMIN — METHOCARBAMOL 500 MG: 100 INJECTION INTRAMUSCULAR; INTRAVENOUS at 06:09

## 2023-03-01 RX ADMIN — Medication 10 ML: at 10:11

## 2023-03-01 RX ADMIN — SODIUM CHLORIDE, PRESERVATIVE FREE 10 ML: 5 INJECTION INTRAVENOUS at 00:43

## 2023-03-01 RX ADMIN — DOCUSATE SODIUM 50 MG AND SENNOSIDES 8.6 MG 1 TABLET: 8.6; 5 TABLET, FILM COATED ORAL at 09:25

## 2023-03-01 RX ADMIN — KETOROLAC TROMETHAMINE 15 MG: 15 INJECTION, SOLUTION INTRAMUSCULAR; INTRAVENOUS at 00:42

## 2023-03-01 RX ADMIN — ACETAMINOPHEN 650 MG: 325 TABLET ORAL at 15:29

## 2023-03-01 RX ADMIN — KETOROLAC TROMETHAMINE 15 MG: 15 INJECTION, SOLUTION INTRAMUSCULAR; INTRAVENOUS at 06:09

## 2023-03-01 RX ADMIN — ENOXAPARIN SODIUM 40 MG: 100 INJECTION SUBCUTANEOUS at 09:28

## 2023-03-01 RX ADMIN — SODIUM CHLORIDE, PRESERVATIVE FREE 10 ML: 5 INJECTION INTRAVENOUS at 06:08

## 2023-03-01 RX ADMIN — METHOCARBAMOL 500 MG: 100 INJECTION INTRAMUSCULAR; INTRAVENOUS at 00:41

## 2023-03-01 RX ADMIN — ACETAMINOPHEN 650 MG: 325 TABLET ORAL at 03:30

## 2023-03-01 RX ADMIN — ACETAMINOPHEN 650 MG: 325 TABLET ORAL at 09:25

## 2023-03-01 ASSESSMENT — PAIN SCALES - GENERAL
PAINLEVEL_OUTOF10: 7
PAINLEVEL_OUTOF10: 3
PAINLEVEL_OUTOF10: 8
PAINLEVEL_OUTOF10: 6
PAINLEVEL_OUTOF10: 3
PAINLEVEL_OUTOF10: 7
PAINLEVEL_OUTOF10: 6

## 2023-03-01 ASSESSMENT — PAIN DESCRIPTION - DESCRIPTORS
DESCRIPTORS: STABBING
DESCRIPTORS: TENDER
DESCRIPTORS: ACHING
DESCRIPTORS: PRESSURE;TENDER

## 2023-03-01 ASSESSMENT — PAIN DESCRIPTION - LOCATION
LOCATION: ABDOMEN

## 2023-03-01 ASSESSMENT — PAIN DESCRIPTION - ORIENTATION
ORIENTATION: LEFT;MID
ORIENTATION: RIGHT;LEFT;MID;LOWER
ORIENTATION: RIGHT;LEFT;LOWER;MID

## 2023-03-01 NOTE — PLAN OF CARE
Problem: Discharge Planning  Goal: Discharge to home or other facility with appropriate resources  3/1/2023 1556 by Chriss Cruz RN  Outcome: Adequate for Discharge  3/1/2023 1123 by Chriss Cruz RN  Outcome: Progressing     Problem: ABCDS Injury Assessment  Goal: Absence of physical injury  3/1/2023 1556 by Chriss Cruz RN  Outcome: Adequate for Discharge  3/1/2023 1123 by Chriss Cruz RN  Outcome: Progressing  Flowsheets (Taken 2/28/2023 2216 by Justice Parker RN)  Absence of Physical Injury: Implement safety measures based on patient assessment     Problem: Pain  Goal: Verbalizes/displays adequate comfort level or baseline comfort level  3/1/2023 1556 by Chriss Cruz RN  Outcome: Adequate for Discharge  3/1/2023 1123 by Chriss Cruz RN  Outcome: Progressing

## 2023-03-01 NOTE — PROGRESS NOTES
Shift assessment completed and medications given per MAR. VSS and alert and oriented. Pain has decreased. Patient is up ambulating and awaiting discharge orders. Call light within reach and bed in lowest position. Will continue to monitor. Patient given discharge paperwork and paper Rxs were given to patient. IVs were removed. Patient requested to walk down to vehicle instead of waiting for transport.

## 2023-03-01 NOTE — PLAN OF CARE
Problem: Discharge Planning  Goal: Discharge to home or other facility with appropriate resources  Outcome: Progressing     Problem: ABCDS Injury Assessment  Goal: Absence of physical injury  Outcome: Progressing  Flowsheets (Taken 2/28/2023 2216 by Kayla Edmond RN)  Absence of Physical Injury: Implement safety measures based on patient assessment     Problem: Pain  Goal: Verbalizes/displays adequate comfort level or baseline comfort level  Outcome: Progressing

## 2023-03-01 NOTE — PLAN OF CARE
Problem: Discharge Planning  Goal: Discharge to home or other facility with appropriate resources  3/1/2023 1556 by Dov Toney RN  Outcome: Completed  3/1/2023 1556 by Dov Toney RN  Outcome: Adequate for Discharge  3/1/2023 1123 by Dov Toney RN  Outcome: Progressing     Problem: ABCDS Injury Assessment  Goal: Absence of physical injury  3/1/2023 1556 by Dov Toney RN  Outcome: Completed  3/1/2023 1556 by Dov Toney RN  Outcome: Adequate for Discharge  3/1/2023 1123 by Dov Toney RN  Outcome: Progressing  Flowsheets (Taken 2/28/2023 2216 by Lia Fenton RN)  Absence of Physical Injury: Implement safety measures based on patient assessment     Problem: Pain  Goal: Verbalizes/displays adequate comfort level or baseline comfort level  3/1/2023 1556 by Dov Toney RN  Outcome: Completed  3/1/2023 1556 by Dov Toney RN  Outcome: Adequate for Discharge  3/1/2023 1123 by Dov Toney RN  Outcome: Progressing

## 2023-03-01 NOTE — DISCHARGE INSTRUCTIONS
Discharge Instructions    Date of admission: 2/27/2023  Date of discharge: 2/28/2023     You are being discharged to home    Follow up:  - Please call to schedule your follow-up appointments - information provided separately. - You will need to follow up with:  - Follow up with Emergency General Surgery in 1-2 weeks for a post-operative follow up  - Follow up with your primary care provider regarding fatty liver disease, which was observed intraoperatively. - See below for information regarding contacting your primary care physician or establishing care with [facility]) if you do not already have one. Restrictions:  - Do not lift, push, pull, or drag anything greater than 10lbs for 4 weeks      Pain control:  - Take Tylenol 325 mg, 1-2 tablets every 4 hours as needed for mild to moderate pain. Mild to moderate pain is characterized by pain 1-6 out of 10 on a pain scale. - Take Oxycodone 5 mg, 1 tablet every 6 hours for as needed for severe pain. Severe pain is characterized as pain of 7-10 out of 10 on a pain scale. - It is okay to take Oxycodone and Tylenol together if needed. If taking Tizanidine and Oxycodone, space them out by 1 hour.  - Please begin to wean off of your pain medications as soon as possible. - To do this, start taking Oxycodone every 8 hours instead of every 6, then every 12 hours, then only once per day if needed. Then stop. - You may use Motrin and Tylenol until your pain is diminished enough for you to tolerate your pain. - Your pain medication may be adjusted or discontinued in follow-up appointments, or by the supervising physician in an inpatient rehab setting.   - Please do not drive within 24 hours of taking Oxycodone or any Opiate medication. Wound Care and Showering/Bathing:  -  Okay to shower daily -- allow soap and water to run down any incisions sites. Do not scrub the area. - If you cannot maintain your balance in the shower, then you should not shower. Sponge baths are the best way to maintain hygiene while your are healing.  - To sponge bath, wet a washcloth with soapy water and gently wash body with the washcloth. Then use a dry washcloth to wipe off.   - No submerging the wound in water or pools until cleared by our office.  - If you notice any increased redness swelling or drainage from your wound call our office immediately. - You may ice your operative site, which is especially useful to minimize swelling. Make sure that the ice is not in direct contact with your skin, and that the ice does not leak out of it's bag. Double-bagging ice is an effective technique. -  If you begin to experience progressive and rapidly increasing pain that seems out of proportion to what you normally have been experiencing from your baseline pain after surgery/injury,  - you NEED TO CALL US IMMEDIATELY. Alternatively, you may come into the [facility] Emergency Department IMMEDIATELY for an emergent evaluation. Update your primary care physician or establish care:  Please see your primary care physician at the next available appointment for follow up. Please call either on the day of your discharge, or the day after, to make the appointment. If you are followed by a managed care company or if your insurance requires, call your physician for authorization to be seen in a specialty clinic. If you do not have a primary physician please call [phone] for guidance on finding a [facility]) provider.      If you have questions or concerns, if your condition worsens or you  develop new symptoms please call the [address] [facility] at [phone].    ---------------------------------------------------------------------------------------------------------------------

## 2023-03-01 NOTE — CARE COORDINATION
Case Management Assessment  Initial Evaluation    Date/Time of Evaluation: 3/1/2023 8:50 AM  Assessment Completed by: Kalani Solorio RN    If patient is discharged prior to next notation, then this note serves as note for discharge by case management. Patient Name: Ruchi Sánchez                   YOB: 1995  Diagnosis: Acute cholecystitis [K81.0]  Calculus of gallbladder with acute cholecystitis without obstruction [K80.00]  Nausea and vomiting, unspecified vomiting type [R11.2]                   Date / Time: 2/27/2023  9:55 PM    Patient Admission Status: Inpatient   Readmission Risk (Low < 19, Mod (19-27), High > 27): Readmission Risk Score: 9.9    Current PCP: Maty Mensah MD  PCP verified by CM? Yes    Chart Reviewed: Yes      History Provided by: Patient  Patient Orientation: Alert and Oriented    Patient Cognition: Alert    Hospitalization in the last 30 days (Readmission):  No    If yes, Readmission Assessment in CM Navigator will be completed. Advance Directives:      Code Status: Full Code   Patient's Primary Decision Maker is:        Discharge Planning:    Patient lives with:   Type of Home:    Primary Care Giver: Self  Patient Support Systems include: Spouse/Significant Other   Current Financial resources:    Current community resources:    Current services prior to admission:              Current DME:              Type of Home Care services:       ADLS  Prior functional level: Independent in ADLs/IADLs  Current functional level: Independent in ADLs/IADLs    PT AM-PAC:   /24  OT AM-PAC:   /24    Family can provide assistance at DC: Yes  Would you like Case Management to discuss the discharge plan with any other family members/significant others, and if so, who?  No  Plans to Return to Present Housing: Yes  Other Identified Issues/Barriers to RETURNING to current housing: N/A  Potential Assistance needed at discharge:              Potential DME:    Patient expects to discharge to:    Plan for transportation at discharge:      Financial    Payor: Lynn Thurston / Plan: Berna Carrion / Product Type: *No Product type* /     Does insurance require precert for SNF: Yes    Potential assistance Purchasing Medications:    Meds-to-Beds request: Yes      [facility] Neelam Cordero, [address]  [address] [place] [address]  Phone: [phone] Fax: [phone]      Notes:    Factors facilitating achievement of predicted outcomes: Motivated    Barriers to discharge: Medical complications    Additional Case Management Notes: MET W/ PATIENT IN ROOM TO DISCUSS D/C PLAN. PATIENT FROM HOME, INDEPENDENT. LIVES W/ BOYFRIEND. D/C PLAN IS TO RETURN TO HOME. DENIES ANY NEEDS. The Plan for Transition of Care is related to the following treatment goals of Acute cholecystitis [K81.0]  Calculus of gallbladder with acute cholecystitis without obstruction [K80.00]  Nausea and vomiting, unspecified vomiting type [S61.1]    IF APPLICABLE: The Patient and/or patient representative Ruth Prather and her family were provided with a choice of provider and agrees with the discharge plan. Freedom of choice list with basic dialogue that supports the patient's individualized plan of care/goals and shares the quality data associated with the providers was provided to:     Patient Representative Name:       The Patient and/or Patient Representative Agree with the Discharge Plan?       Josh Chicas RN  Case Management Department  Ph: [phone] Fax:

## 2023-03-01 NOTE — PROGRESS NOTES
Nutrition Note    Pt triggered on malnutrition admission assessment for poor po intake pta. Nutrition assessment deferred at this time as pt states these symptoms were acute and were only present several days pta. PO intake and appetite are currently very good post surgery, denies any wt loss.  RD available for further concerns as needed    Electronically signed by Carisa Germain RD, LD on 3/1/23 at 2:47 PM EST

## 2023-03-02 NOTE — DISCHARGE SUMMARY
[address]  [address]  [place], [address] Mary Umang Wallace  MRN: [id number]  YOB: 1995  33 y. o.female      Attending  No att. providers found ? Date of Admission  2/27/2023 Date of Discharge  2/28/2023      ? DIAGNOSES:  Principal Problem:    Acute cholecystitis  Active Problems:    Acute post-operative pain    Hepatic steatosis  Resolved Problems:    * No resolved hospital problems. *         PROCEDURES:  2/28/2023: laparoscopic cholecystectomy with Dr. Le Nguyen  ? DISCHARGE MEDICATIONS:  Discharge Medication List as of 3/1/2023  4:02 PM             Details   oxyCODONE (ROXICODONE) 5 MG immediate release tablet Take 1 tablet by mouth every 6 hours as needed for Pain (for severe pain only) for up to 4 days. Max Daily Amount: 20 mg, Disp-16 tablet, R-0Print      acetaminophen (TYLENOL) 325 MG tablet Take 2 tablets by mouth in the morning and 2 tablets at noon and 2 tablets in the evening and 2 tablets before bedtime. Do all this for 14 days. , Disp-112 tablet, R-0Print      sennosides-docusate sodium (SENOKOT-S) 8.6-50 MG tablet Take 1 tablet by mouth 2 times daily for 14 days, Disp-28 tablet, R-0Print                Details   PROAIR  (90 Base) MCG/ACT inhaler INHALE 2 PUFFS into the lungs FOUR TIMES DAILY AS NEEDED FOR WHEEZING, Disp-8.5 g, R-2Normal      tiZANidine (ZANAFLEX) 4 MG tablet TAKE 1 TABLET BY MOUTH EVERY 8 HOURS AS NEEDED for spasms, Disp-60 tablet, R-2Normal      topiramate (TOPAMAX) 100 MG tablet TAKE 1 TABLET BY MOUTH TWICE DAILY, Disp-60 tablet, R-0Normal      busPIRone (BUSPAR) 10 MG tablet Take 1 tablet by mouth twice a day with meals, Disp-60 tablet, R-5Normal      cetirizine-psuedoephedrine (ZYRTEC-D) 5-120 MG per extended release tablet TAKE 1 TABLET BY MOUTH TWICE DAILY, Disp-60 tablet, R-2Normal      montelukast (SINGULAIR) 10 MG tablet Take 1 tablet by mouth nightly, Disp-90 tablet, R-1Normal      ASMANEX, 30 METERED DOSES, 220 MCG/INH inhaler INHALE 1 PUFF into the lungs DAILY, Disp-1 Inhaler, R-5Normal      hydrOXYzine (VISTARIL) 25 MG capsule TAKE 1 CAPSULE BY MOUTH FOUR TIMES DAILY AS NEEDED FOR ANXIETYHistorical Med      naratriptan (AMERGE) 2.5 MG tablet Take 1 tablet by mouth once as needed for Migraine 2.5 mg at onset of headache, may repeat in 4 hours if needed, Disp-9 tablet, R-3Normal               REASON FOR HOSPITALIZATION:  Chapito Unger is a 28yo female with PMHx of anxiety, asthma, bipolar depression, and migraines. Patient presented to [facility] EMERGENCY [facility] AT [place] [facility] on 2/28/2023 with complaint of sudden onset, severe, RUQ pain with associated N/V.    ED work up supportive of acute cholecystitis with cholelithiasis on CT imaging and with mild leukocytosis. Patient admitted to S [facility] with plan for operative management. SIGNIFICANT FINDINGS:  Catalog of Injuries:   Acute RUQ abdominal pain  Acute cholecystitis with cholelithiasis  Hepatic steatosis  Acute post-operative pain    Incidental Findings: Discussed with patient and patient's mother at bedside, RAL 3/1/2023  Visualization of hepatitis steatosis on intraoperative imaging. HOSPITAL COURSE:  2/28/2023: sudden onset of RUQ severe pain with associated N/V. CT imaging supportive of acute cholecystitis. S/p laparoscopic cholecystectomy with Dr. Lundberg. 3/1/2023: Medically cleared for discharge home. Pain control significantly improved and tolerating PO    At time of discharge, Keny Carballo was tolerating a regular diet, having bowel movements, and had adequate analgesia on oral pain medications. Pt's activity level was OOBAT. The patient had no signs or symptoms of complications. Patient was determined stable for discharge to: Home    The patient was seen and examined on the day of discharge with the following findings:  General Appearance: Sitting up in hospital bed, appears comfortable, mother at bedside   Skin: X4 surgical port-site incisions.  Larger umbilical incision with ecchymosis. Incisions c/d/I.    HEENT: NCAT, aniceteric   Neck: Supple, full ROM   Lymph Nodes: n/a   Cardiovascular: RRR   Lungs: non-labored respirations   Breast/Chest: No deformity   Abdomen: Soft, non-distended, mild TTP to lower abdominal quadrants and RUQ -- significantly improved from pre-operative exam. Non-peritonitic   Genitourinary: deferred   Extremities: No deformity   Neurologic: AOx3, cooperative, FARNSWORTH to command, no focal deficits           ANTICIPATED FOLLOW UP:  No future appointments.  No discharge procedures on file.    Other indicated follow up and instructions for scheduling:  Follow up with EGS clinic in 1 week for post-op evaluation. Patient to remain off work for 1 week ()  Follow up with PCP regarding hepatitic steatosis.       VTE RISK AT DISCHARGE:  Per trauma program protocol, the patient does not require post-discharge VTE prophylaxis.    --  Jessie Augustin PA-C  Trauma/Critical Care/ Emergency General Surgery    [see treatment team sticky note for contact information]      35 minutes were spent on the discharge of this patient including final examination of the patient, discussion of the hospital stay, instructions for continuing care to all relevant caregivers, preparation of discharge records, prescriptions and referral forms, and clear identification of reasons to return to clinic or to emergency room.

## 2023-03-08 ENCOUNTER — OFFICE VISIT (OUTPATIENT)
Dept: SURGERY | Age: 28
End: 2023-03-08

## 2023-03-08 VITALS
OXYGEN SATURATION: 99 % | WEIGHT: 200 LBS | HEART RATE: 70 BPM | HEIGHT: 68 IN | TEMPERATURE: 97.6 F | BODY MASS INDEX: 30.31 KG/M2

## 2023-03-08 DIAGNOSIS — Z48.89 ENCOUNTER FOR POSTOPERATIVE WOUND CHECK: ICD-10-CM

## 2023-03-08 DIAGNOSIS — Z90.49 S/P LAPAROSCOPIC CHOLECYSTECTOMY: Primary | ICD-10-CM

## 2023-03-08 PROCEDURE — 99024 POSTOP FOLLOW-UP VISIT: CPT | Performed by: STUDENT IN AN ORGANIZED HEALTH CARE EDUCATION/TRAINING PROGRAM

## 2023-03-08 NOTE — PATIENT INSTRUCTIONS
- Continue to monitor incision for signs of infection  - Follow up as needed for any questions or concerns  - Continue no lifting any heavier than 25 pounds for 2-4 weeks  - OK to return to work

## 2023-03-08 NOTE — PROGRESS NOTES
TRAUMA/EMERGENCY GENERAL SURGERY CLINIC NOTE    Subjective:  Karissa Castillo is a 33 y.o. female who is here for follow up for post-op check. Pt is s/p lap cholecystectomy with Dr. Abhishek Dmuont on 2/28/2023. Patient was discharged home the same day. Overall, the patient is doing well. She denies any pain at this time. Taking tylenol and ibuprofen as needed. She is tolerating PO intake without difficulty and having regular bowel movements. Denies any erythema or drainage from her incisions. Denies fevers and chills. Vitals:    03/08/23 1413   Pulse: 70   Temp: 97.6 °F (36.4 °C)   SpO2: 99%     Physical Exam:  Gen: Alert, well developed, NAD  CV: RRR, S1, S2. Normal pulses. Pulm: Non labored respirations, on room air. ABD: Soft, nontender, nondistended. Incisions x 4 c/d/I with overlying dermabond. No erythema, induration or drainage. Healing bruise to LLQ  Neuro: No focal deficits, A&Ox3, appropriate  Ext: no edema, warm and dry       Medications:  Current Outpatient Medications on File Prior to Visit   Medication Sig Dispense Refill    acetaminophen (TYLENOL) 325 MG tablet Take 2 tablets by mouth in the morning and 2 tablets at noon and 2 tablets in the evening and 2 tablets before bedtime. Do all this for 14 days.  112 tablet 0    sennosides-docusate sodium (SENOKOT-S) 8.6-50 MG tablet Take 1 tablet by mouth 2 times daily for 14 days 28 tablet 0    PROAIR  (90 Base) MCG/ACT inhaler INHALE 2 PUFFS into the lungs FOUR TIMES DAILY AS NEEDED FOR WHEEZING 8.5 g 2    tiZANidine (ZANAFLEX) 4 MG tablet TAKE 1 TABLET BY MOUTH EVERY 8 HOURS AS NEEDED for spasms (Patient taking differently: Take 4 mg by mouth every 30 days) 60 tablet 2    topiramate (TOPAMAX) 100 MG tablet TAKE 1 TABLET BY MOUTH TWICE DAILY 60 tablet 0    busPIRone (BUSPAR) 10 MG tablet Take 1 tablet by mouth twice a day with meals 60 tablet 5    cetirizine-psuedoephedrine (ZYRTEC-D) 5-120 MG per extended release tablet TAKE 1 TABLET BY MOUTH TWICE DAILY 60 tablet 2    montelukast (SINGULAIR) 10 MG tablet Take 1 tablet by mouth nightly 90 tablet 1    ASMANEX, 30 METERED DOSES, 220 MCG/INH inhaler INHALE 1 PUFF into the lungs DAILY 1 Inhaler 5    hydrOXYzine (VISTARIL) 25 MG capsule TAKE 1 CAPSULE BY MOUTH FOUR TIMES DAILY AS NEEDED FOR ANXIETY      naratriptan (AMERGE) 2.5 MG tablet Take 1 tablet by mouth once as needed for Migraine 2.5 mg at onset of headache, may repeat in 4 hours if needed 9 tablet 3     No current facility-administered medications on file prior to visit. Labs:  No new labs     Pathology: (discussed with patient and her mother)  Acute cholecystitis     Studies:    No new imaging studies       Assessment:  Leonidas Arredondo is a 33 y.o. female who is here for follow up for post-op check. Pt is s/p lap cholecystectomy with Dr. Cee Olvera on 2/28/2023. Pt is overall doing well and healing appropriately. Tolerating PO intake with difficulty and having bowel movements. Incisions x 4 are C/D/I without sign of infection. Denies fevers/chills. Plan:  - OK to return to work. Letter given to patient. - Continue weight lifting restriction of 25 pounds for 2-4 more weeks. - Continue to monitor for signs and symptoms of infection at the incision sites   - Follow up as needed for any questions or concerns.      Zaida Orona PA-C  Trauma/Critical Care  Emergency General Surgery  [phone] ([phone])  [phone]      Patient discussed with attending Trauma Physician, Kenia Daniels MD.

## 2023-03-08 NOTE — LETTER
[facility] Trauma Surg  [address] [facility]  [place] [id number]  Phone: [phone]  Fax: [phone]     03/08/23     RE: Shae Rao     To Whom it May Concern:    [facility] is currently treating patient Shae Rao. It is my professional judgement that she can return to work with the following restrictions: no heavy lifting anything heavier than 25 pounds until 4/5/2023. After 4/6/2023, can return without restrictions. Shae Purcellmitul has requested and authorized me to send this letter to you on her behalf.        Sincerely,          Candido Eden PA-C  Trauma and Acute Care Surgery

## 2023-08-04 ENCOUNTER — HOSPITAL ENCOUNTER (EMERGENCY)
Age: 28
Discharge: HOME | End: 2023-08-04
Payer: COMMERCIAL

## 2023-08-04 ENCOUNTER — APPOINTMENT (OUTPATIENT)
Dept: CT IMAGING | Age: 28
End: 2023-08-04
Payer: COMMERCIAL

## 2023-08-04 ENCOUNTER — APPOINTMENT (OUTPATIENT)
Dept: GENERAL RADIOLOGY | Age: 28
End: 2023-08-04
Payer: COMMERCIAL

## 2023-08-04 VITALS
TEMPERATURE: 97.8 F | RESPIRATION RATE: 17 BRPM | DIASTOLIC BLOOD PRESSURE: 70 MMHG | WEIGHT: 189 LBS | BODY MASS INDEX: 28.64 KG/M2 | HEART RATE: 68 BPM | HEIGHT: 68 IN | SYSTOLIC BLOOD PRESSURE: 110 MMHG | OXYGEN SATURATION: 99 %

## 2023-08-04 DIAGNOSIS — F12.90 CANNABIS USE DISORDER: ICD-10-CM

## 2023-08-04 DIAGNOSIS — R10.9 ABDOMINAL PAIN, UNSPECIFIED ABDOMINAL LOCATION: Primary | ICD-10-CM

## 2023-08-04 DIAGNOSIS — N30.00 ACUTE CYSTITIS WITHOUT HEMATURIA: ICD-10-CM

## 2023-08-04 DIAGNOSIS — R11.2 NAUSEA AND VOMITING, UNSPECIFIED VOMITING TYPE: ICD-10-CM

## 2023-08-04 LAB
ALBUMIN SERPL-MCNC: 4.4 G/DL (ref 3.5–4.6)
ALP SERPL-CCNC: 73 U/L (ref 40–130)
ALT SERPL-CCNC: 22 U/L (ref 0–33)
AMPHET UR QL SCN: ABNORMAL
ANION GAP SERPL CALCULATED.3IONS-SCNC: 8 MEQ/L (ref 9–15)
AST SERPL-CCNC: 23 U/L (ref 0–35)
BACTERIA URNS QL MICRO: ABNORMAL /HPF
BARBITURATES UR QL SCN: ABNORMAL
BASOPHILS # BLD: 0 K/UL (ref 0–0.2)
BASOPHILS NFR BLD: 0.5 %
BENZODIAZ UR QL SCN: ABNORMAL
BILIRUB SERPL-MCNC: 0.4 MG/DL (ref 0.2–0.7)
BILIRUB UR QL STRIP: NEGATIVE
BUN SERPL-MCNC: 12 MG/DL (ref 6–20)
CALCIUM SERPL-MCNC: 9.2 MG/DL (ref 8.5–9.9)
CANNABINOIDS UR QL SCN: POSITIVE
CHLORIDE SERPL-SCNC: 105 MEQ/L (ref 95–107)
CLARITY UR: CLEAR
CO2 SERPL-SCNC: 27 MEQ/L (ref 20–31)
COCAINE UR QL SCN: ABNORMAL
COLOR UR: YELLOW
CREAT SERPL-MCNC: 0.68 MG/DL (ref 0.5–0.9)
DRUG SCREEN COMMENT UR-IMP: ABNORMAL
EKG ATRIAL RATE: 74 BPM
EKG P AXIS: 72 DEGREES
EKG P-R INTERVAL: 150 MS
EKG Q-T INTERVAL: 392 MS
EKG QRS DURATION: 92 MS
EKG QTC CALCULATION (BAZETT): 435 MS
EKG R AXIS: 18 DEGREES
EKG T AXIS: 33 DEGREES
EKG VENTRICULAR RATE: 74 BPM
EOSINOPHIL # BLD: 0.1 K/UL (ref 0–0.7)
EOSINOPHIL NFR BLD: 1.9 %
EPI CELLS #/AREA URNS AUTO: ABNORMAL /HPF (ref 0–5)
ERYTHROCYTE [DISTWIDTH] IN BLOOD BY AUTOMATED COUNT: 14.7 % (ref 11.5–14.5)
FENTANYL SCREEN, URINE: ABNORMAL
GLOBULIN SER CALC-MCNC: 2.9 G/DL (ref 2.3–3.5)
GLUCOSE SERPL-MCNC: 121 MG/DL (ref 70–99)
GLUCOSE UR STRIP-MCNC: NEGATIVE MG/DL
HCG, URINE, POC: NEGATIVE
HCT VFR BLD AUTO: 42.4 % (ref 37–47)
HGB BLD-MCNC: 14.3 G/DL (ref 12–16)
HGB UR QL STRIP: NEGATIVE
HYALINE CASTS #/AREA URNS AUTO: ABNORMAL /HPF (ref 0–5)
KETONES UR STRIP-MCNC: NEGATIVE MG/DL
LACTATE BLDV-SCNC: 0.7 MMOL/L (ref 0.5–2.2)
LEUKOCYTE ESTERASE UR QL STRIP: ABNORMAL
LIPASE SERPL-CCNC: 20 U/L (ref 12–95)
LYMPHOCYTES # BLD: 1.9 K/UL (ref 1–4.8)
LYMPHOCYTES NFR BLD: 27.1 %
Lab: NORMAL
MAGNESIUM SERPL-MCNC: 2.3 MG/DL (ref 1.7–2.4)
MCH RBC QN AUTO: 28.2 PG (ref 27–31.3)
MCHC RBC AUTO-ENTMCNC: 33.6 % (ref 33–37)
MCV RBC AUTO: 83.8 FL (ref 79.4–94.8)
METHADONE UR QL SCN: ABNORMAL
MONOCYTES # BLD: 0.4 K/UL (ref 0.2–0.8)
MONOCYTES NFR BLD: 5.1 %
NEGATIVE QC PASS/FAIL: NORMAL
NEUTROPHILS # BLD: 4.5 K/UL (ref 1.4–6.5)
NEUTS SEG NFR BLD: 65.4 %
NITRITE UR QL STRIP: NEGATIVE
OPIATES UR QL SCN: ABNORMAL
OXYCODONE UR QL SCN: ABNORMAL
PCP UR QL SCN: ABNORMAL
PH UR STRIP: 7.5 [PH] (ref 5–9)
PLATELET # BLD AUTO: 239 K/UL (ref 130–400)
POC CREATININE WHOLE BLOOD: 0.8
POSITIVE QC PASS/FAIL: NORMAL
POTASSIUM SERPL-SCNC: 4.5 MEQ/L (ref 3.4–4.9)
PROPOXYPH UR QL SCN: ABNORMAL
PROT SERPL-MCNC: 7.3 G/DL (ref 6.3–8)
PROT UR STRIP-MCNC: NEGATIVE MG/DL
RBC # BLD AUTO: 5.06 M/UL (ref 4.2–5.4)
RBC #/AREA URNS AUTO: ABNORMAL /HPF (ref 0–5)
SODIUM SERPL-SCNC: 140 MEQ/L (ref 135–144)
SP GR UR STRIP: 1.02 (ref 1–1.03)
TROPONIN T SERPL-MCNC: <0.01 NG/ML (ref 0–0.01)
URINE REFLEX TO CULTURE: ABNORMAL
UROBILINOGEN UR STRIP-ACNC: 0.2 E.U./DL
WBC # BLD AUTO: 6.9 K/UL (ref 4.8–10.8)
WBC #/AREA URNS HPF: ABNORMAL /HPF (ref 0–5)

## 2023-08-04 PROCEDURE — 93005 ELECTROCARDIOGRAM TRACING: CPT | Performed by: STUDENT IN AN ORGANIZED HEALTH CARE EDUCATION/TRAINING PROGRAM

## 2023-08-04 PROCEDURE — 6360000002 HC RX W HCPCS: Performed by: STUDENT IN AN ORGANIZED HEALTH CARE EDUCATION/TRAINING PROGRAM

## 2023-08-04 PROCEDURE — 80053 COMPREHEN METABOLIC PANEL: CPT

## 2023-08-04 PROCEDURE — 74177 CT ABD & PELVIS W/CONTRAST: CPT

## 2023-08-04 PROCEDURE — 83735 ASSAY OF MAGNESIUM: CPT

## 2023-08-04 PROCEDURE — 99285 EMERGENCY DEPT VISIT HI MDM: CPT

## 2023-08-04 PROCEDURE — C9113 INJ PANTOPRAZOLE SODIUM, VIA: HCPCS | Performed by: STUDENT IN AN ORGANIZED HEALTH CARE EDUCATION/TRAINING PROGRAM

## 2023-08-04 PROCEDURE — 96367 TX/PROPH/DG ADDL SEQ IV INF: CPT

## 2023-08-04 PROCEDURE — 81001 URINALYSIS AUTO W/SCOPE: CPT

## 2023-08-04 PROCEDURE — 83605 ASSAY OF LACTIC ACID: CPT

## 2023-08-04 PROCEDURE — 96375 TX/PRO/DX INJ NEW DRUG ADDON: CPT

## 2023-08-04 PROCEDURE — 84484 ASSAY OF TROPONIN QUANT: CPT

## 2023-08-04 PROCEDURE — 80307 DRUG TEST PRSMV CHEM ANLYZR: CPT

## 2023-08-04 PROCEDURE — 96376 TX/PRO/DX INJ SAME DRUG ADON: CPT

## 2023-08-04 PROCEDURE — 85025 COMPLETE CBC W/AUTO DIFF WBC: CPT

## 2023-08-04 PROCEDURE — 83690 ASSAY OF LIPASE: CPT

## 2023-08-04 PROCEDURE — 36415 COLL VENOUS BLD VENIPUNCTURE: CPT

## 2023-08-04 PROCEDURE — 6360000004 HC RX CONTRAST MEDICATION: Performed by: STUDENT IN AN ORGANIZED HEALTH CARE EDUCATION/TRAINING PROGRAM

## 2023-08-04 PROCEDURE — 96365 THER/PROPH/DIAG IV INF INIT: CPT

## 2023-08-04 PROCEDURE — 2580000003 HC RX 258: Performed by: STUDENT IN AN ORGANIZED HEALTH CARE EDUCATION/TRAINING PROGRAM

## 2023-08-04 PROCEDURE — 71045 X-RAY EXAM CHEST 1 VIEW: CPT

## 2023-08-04 RX ORDER — ONDANSETRON 2 MG/ML
4 INJECTION INTRAMUSCULAR; INTRAVENOUS ONCE
Status: COMPLETED | OUTPATIENT
Start: 2023-08-04 | End: 2023-08-04

## 2023-08-04 RX ORDER — MORPHINE SULFATE 4 MG/ML
4 INJECTION, SOLUTION INTRAMUSCULAR; INTRAVENOUS ONCE
Status: COMPLETED | OUTPATIENT
Start: 2023-08-04 | End: 2023-08-04

## 2023-08-04 RX ORDER — TRAMADOL HYDROCHLORIDE 50 MG/1
50 TABLET ORAL EVERY 4 HOURS PRN
Qty: 18 TABLET | Refills: 0 | Status: SHIPPED | OUTPATIENT
Start: 2023-08-04 | End: 2023-08-07

## 2023-08-04 RX ORDER — MORPHINE SULFATE 2 MG/ML
2 INJECTION, SOLUTION INTRAMUSCULAR; INTRAVENOUS ONCE
Status: COMPLETED | OUTPATIENT
Start: 2023-08-04 | End: 2023-08-04

## 2023-08-04 RX ORDER — SULFAMETHOXAZOLE AND TRIMETHOPRIM 800; 160 MG/1; MG/1
1 TABLET ORAL 2 TIMES DAILY
Qty: 20 TABLET | Refills: 0 | Status: SHIPPED | OUTPATIENT
Start: 2023-08-04 | End: 2023-08-14

## 2023-08-04 RX ORDER — PANTOPRAZOLE SODIUM 20 MG/1
20 TABLET, DELAYED RELEASE ORAL DAILY
Qty: 30 TABLET | Refills: 0 | Status: SHIPPED | OUTPATIENT
Start: 2023-08-04

## 2023-08-04 RX ORDER — 0.9 % SODIUM CHLORIDE 0.9 %
1000 INTRAVENOUS SOLUTION INTRAVENOUS ONCE
Status: COMPLETED | OUTPATIENT
Start: 2023-08-04 | End: 2023-08-04

## 2023-08-04 RX ORDER — ONDANSETRON 4 MG/1
4 TABLET, ORALLY DISINTEGRATING ORAL 3 TIMES DAILY PRN
Qty: 21 TABLET | Refills: 0 | Status: SHIPPED | OUTPATIENT
Start: 2023-08-04 | End: 2023-08-11

## 2023-08-04 RX ADMIN — MORPHINE SULFATE 2 MG: 2 INJECTION, SOLUTION INTRAMUSCULAR; INTRAVENOUS at 14:29

## 2023-08-04 RX ADMIN — CEFTRIAXONE SODIUM 1000 MG: 1 INJECTION, POWDER, FOR SOLUTION INTRAMUSCULAR; INTRAVENOUS at 16:21

## 2023-08-04 RX ADMIN — IOPAMIDOL 50 ML: 612 INJECTION, SOLUTION INTRAVENOUS at 14:42

## 2023-08-04 RX ADMIN — MORPHINE SULFATE 4 MG: 4 INJECTION, SOLUTION INTRAMUSCULAR; INTRAVENOUS at 16:21

## 2023-08-04 RX ADMIN — SODIUM CHLORIDE 1000 ML: 9 INJECTION, SOLUTION INTRAVENOUS at 13:29

## 2023-08-04 RX ADMIN — SODIUM CHLORIDE 40 MG: 900 INJECTION INTRAVENOUS at 13:29

## 2023-08-04 RX ADMIN — ONDANSETRON 4 MG: 2 INJECTION INTRAMUSCULAR; INTRAVENOUS at 13:30

## 2023-08-04 RX ADMIN — MORPHINE SULFATE 2 MG: 2 INJECTION, SOLUTION INTRAMUSCULAR; INTRAVENOUS at 13:30

## 2023-08-04 ASSESSMENT — PAIN DESCRIPTION - LOCATION
LOCATION: ABDOMEN
LOCATION: ABDOMEN
LOCATION: ABDOMEN;BACK
LOCATION: ABDOMEN
LOCATION: ABDOMEN;BACK
LOCATION: ABDOMEN;BACK

## 2023-08-04 ASSESSMENT — PAIN SCALES - GENERAL
PAINLEVEL_OUTOF10: 9
PAINLEVEL_OUTOF10: 8
PAINLEVEL_OUTOF10: 9
PAINLEVEL_OUTOF10: 2
PAINLEVEL_OUTOF10: 9

## 2023-08-04 ASSESSMENT — PAIN - FUNCTIONAL ASSESSMENT
PAIN_FUNCTIONAL_ASSESSMENT: 0-10
PAIN_FUNCTIONAL_ASSESSMENT: 0-10

## 2023-08-04 ASSESSMENT — PAIN DESCRIPTION - DESCRIPTORS: DESCRIPTORS: CRAMPING

## 2023-08-04 ASSESSMENT — LIFESTYLE VARIABLES: HOW OFTEN DO YOU HAVE A DRINK CONTAINING ALCOHOL: NEVER

## 2023-08-04 ASSESSMENT — PAIN DESCRIPTION - ORIENTATION
ORIENTATION: RIGHT
ORIENTATION: RIGHT

## 2023-08-04 ASSESSMENT — PAIN DESCRIPTION - PAIN TYPE: TYPE: ACUTE PAIN

## 2023-08-04 NOTE — ED PROVIDER NOTES
[facility] ED  EMERGENCY DEPARTMENT ENCOUNTER      Pt Name: Amanuel Wilson  MRN: [id number]  [address] [facility] 1995  Date of evaluation: 8/4/2023  Provider: SHANTEL Tejeda       Chief Complaint   Patient presents with    Abdominal Pain     RUQ radiating to right flank/right lower back, worsening since yesterday,           HISTORY OF PRESENT ILLNESS   (Location/Symptom, Timing/Onset, Context/Setting, Quality, Duration, Modifying Factors, Severity)  Note limiting factors. Amanuel Wilson is a 33 y.o. female who per chart review has pmhx of acute cholecystitis, hepatic steatosis, obesity, chronic migraine presents to the emergency department for evaluation of abd pain beginning this morning. Pt reports epigastric, RUQ pain that radiates into the R flank. It is constant, rated 9/10. Described as cramping and sharp. States it feels somewhat similar to pain she experienced prior to getting her gallbladder removed. There is associated nv, non bloody. She has not tried anything for sx at home. Denies aggravating and alleviating factors. Denies etoh use, uses marijuana. Denies sick contacts, recent travel, possible exposure to undercooked or spoiled foods. She does note that sometimes the pain travels into the chest. She denies fever, chills, cp, sob, cough, diarrhea, constipation, urinary sx, pelvic pain, vaginal discharge, dizziness, upper respiratory sx, blood in the stool or urine. HPI    Nursing Notes were reviewed. REVIEW OF SYSTEMS    (2-9 systems for level 4, 10 or more for level 5)     Review of Systems   Constitutional:  Negative for chills and fever. HENT:  Negative for congestion. Eyes:  Negative for photophobia. Respiratory:  Negative for cough, shortness of breath and wheezing. Cardiovascular:  Negative for chest pain and palpitations. Gastrointestinal:  Positive for abdominal pain, nausea and vomiting.  Negative for abdominal distention, anal bleeding, blood in stool, constipation and diarrhea. Genitourinary:  Negative for dysuria, frequency and hematuria. Musculoskeletal:  Negative for myalgias. Allergic/Immunologic: Negative for immunocompromised state. Neurological:  Negative for dizziness, weakness and headaches. All other systems reviewed and are negative. Except as noted above the remainder of the review of systems was reviewed and negative. PAST MEDICAL HISTORY     Past Medical History:   Diagnosis Date    Anxiety     Asthma     Bipolar depression ([address])     Migraines          SURGICAL HISTORY       Past Surgical History:   Procedure Laterality Date    CHOLECYSTECTOMY, LAPAROSCOPIC N/A 2/28/2023    LAPAROSCOPIC CHOLECYSTECTOMY performed by Monse Dang MD at [address] [place] [address]       Previous Medications    ACETAMINOPHEN (TYLENOL) 325 MG TABLET    Take 2 tablets by mouth in the morning and 2 tablets at noon and 2 tablets in the evening and 2 tablets before bedtime. Do all this for 14 days.     ASMANEX, 30 METERED DOSES, 220 MCG/INH INHALER    INHALE 1 PUFF into the lungs DAILY    BUSPIRONE (BUSPAR) 10 MG TABLET    Take 1 tablet by mouth twice a day with meals    CETIRIZINE-PSUEDOEPHEDRINE (ZYRTEC-D) 5-120 MG PER EXTENDED RELEASE TABLET    TAKE 1 TABLET BY MOUTH TWICE DAILY    HYDROXYZINE (VISTARIL) 25 MG CAPSULE    TAKE 1 CAPSULE BY MOUTH FOUR TIMES DAILY AS NEEDED FOR ANXIETY    MONTELUKAST (SINGULAIR) 10 MG TABLET    Take 1 tablet by mouth nightly    NARATRIPTAN (AMERGE) 2.5 MG TABLET    Take 1 tablet by mouth once as needed for Migraine 2.5 mg at onset of headache, may repeat in 4 hours if needed    PROAIR  (90 BASE) MCG/ACT INHALER    INHALE 2 PUFFS into the lungs FOUR TIMES DAILY AS NEEDED FOR WHEEZING    TIZANIDINE (ZANAFLEX) 4 MG TABLET    TAKE 1 TABLET BY MOUTH EVERY 8 HOURS AS NEEDED for spasms    TOPIRAMATE (TOPAMAX) 100 MG TABLET    TAKE 1 TABLET BY MOUTH TWICE DAILY       ALLERGIES     Aspirin and

## 2023-08-04 NOTE — ED TRIAGE NOTES
Pt to ed from home via triage with c/o abdominal pain  Pt reports intermittent nausea with pain. Pt states pain ongoing and worsening since yesterday  Pt denies dysuria or hematuria. Pt denies emesis or SOB. Skin WDI. Respirations even and unlabored.

## 2023-08-06 LAB
PERFORMED ON: NORMAL
POC CREATININE: 0.8 MG/DL (ref 0.6–1.2)
POC SAMPLE TYPE: NORMAL

## 2023-08-06 ASSESSMENT — ENCOUNTER SYMPTOMS
ABDOMINAL PAIN: 1
SHORTNESS OF BREATH: 0
PHOTOPHOBIA: 0
NAUSEA: 1
ABDOMINAL DISTENTION: 0
BLOOD IN STOOL: 0
VOMITING: 1
COUGH: 0
CONSTIPATION: 0
ANAL BLEEDING: 0
WHEEZING: 0
DIARRHEA: 0

## 2024-07-02 ENCOUNTER — HOSPITAL ENCOUNTER (EMERGENCY)
Age: 29
Discharge: HOME | End: 2024-07-02

## 2024-07-02 VITALS
WEIGHT: 200 LBS | SYSTOLIC BLOOD PRESSURE: 139 MMHG | BODY MASS INDEX: 30.31 KG/M2 | OXYGEN SATURATION: 98 % | HEIGHT: 68 IN | HEART RATE: 80 BPM | RESPIRATION RATE: 15 BRPM | DIASTOLIC BLOOD PRESSURE: 85 MMHG | TEMPERATURE: 98 F

## 2024-07-02 DIAGNOSIS — S39.012A STRAIN OF LUMBAR REGION, INITIAL ENCOUNTER: Primary | ICD-10-CM

## 2024-07-02 PROCEDURE — 99283 EMERGENCY DEPT VISIT LOW MDM: CPT

## 2024-07-02 RX ORDER — PREDNISONE 10 MG/1
50 TABLET ORAL DAILY
Qty: 25 TABLET | Refills: 0 | Status: SHIPPED | OUTPATIENT
Start: 2024-07-02 | End: 2024-07-07

## 2024-07-02 RX ORDER — OXYCODONE HYDROCHLORIDE AND ACETAMINOPHEN 5; 325 MG/1; MG/1
1 TABLET ORAL EVERY 6 HOURS PRN
Qty: 10 TABLET | Refills: 0 | Status: SHIPPED | OUTPATIENT
Start: 2024-07-02 | End: 2024-07-05

## 2024-07-02 RX ORDER — METHOCARBAMOL 750 MG/1
750 TABLET, FILM COATED ORAL 4 TIMES DAILY
Qty: 20 TABLET | Refills: 0 | Status: SHIPPED | OUTPATIENT
Start: 2024-07-02 | End: 2024-07-07

## 2024-07-02 ASSESSMENT — ENCOUNTER SYMPTOMS
ABDOMINAL PAIN: 0
COUGH: 0
BACK PAIN: 1
SHORTNESS OF BREATH: 0
VOMITING: 0
NAUSEA: 0
DIARRHEA: 0

## 2024-07-02 ASSESSMENT — LIFESTYLE VARIABLES
HOW MANY STANDARD DRINKS CONTAINING ALCOHOL DO YOU HAVE ON A TYPICAL DAY: PATIENT DOES NOT DRINK
HOW OFTEN DO YOU HAVE A DRINK CONTAINING ALCOHOL: NEVER

## 2024-07-02 ASSESSMENT — PAIN DESCRIPTION - LOCATION: LOCATION: BACK

## 2024-07-02 ASSESSMENT — PAIN - FUNCTIONAL ASSESSMENT: PAIN_FUNCTIONAL_ASSESSMENT: 0-10

## 2024-07-02 ASSESSMENT — PAIN SCALES - GENERAL: PAINLEVEL_OUTOF10: 8

## 2024-07-02 ASSESSMENT — PAIN DESCRIPTION - DESCRIPTORS: DESCRIPTORS: DISCOMFORT

## 2024-07-02 ASSESSMENT — PAIN DESCRIPTION - ORIENTATION: ORIENTATION: RIGHT

## 2024-07-02 NOTE — ED PROVIDER NOTES
[facility] ED  eMERGENCY dEPARTMENT eNCOUnter      Pt Name: Simona Gamez  MRN: [id number]  Birthdate 1995  Date of evaluation: 7/2/2024  Provider: KENNETH Balbuena CNP      HISTORY OF PRESENT ILLNESS    Simona Gamez is a 29 y.o. female who presents to the Emergency Department with R lower back pain that started 2 days ago.  Pain is moderate.  She denies any saddle anesthesia, foot drop or incontinence of bowel or bladder.  She is able to ambulate without difficulty.  No inury or trauma.         REVIEW OF SYSTEMS       Review of Systems   Constitutional:  Negative for fever.   HENT:  Negative for congestion.    Respiratory:  Negative for cough and shortness of breath.    Cardiovascular:  Negative for chest pain.   Gastrointestinal:  Negative for abdominal pain, diarrhea, nausea and vomiting.   Genitourinary:  Negative for dysuria.   Musculoskeletal:  Positive for back pain. Negative for arthralgias and neck pain.   Skin:  Negative for rash.   All other systems reviewed and are negative.        PAST MEDICAL HISTORY     Past Medical History:   Diagnosis Date    Anxiety     Asthma     Bipolar depression (HCC)     Migraines          SURGICAL HISTORY       Past Surgical History:   Procedure Laterality Date    CHOLECYSTECTOMY, LAPAROSCOPIC N/A 2/28/2023    LAPAROSCOPIC CHOLECYSTECTOMY performed by Pankaj Monique MD at [facility] OR         CURRENT MEDICATIONS       Previous Medications    ACETAMINOPHEN (TYLENOL) 325 MG TABLET    Take 2 tablets by mouth in the morning and 2 tablets at noon and 2 tablets in the evening and 2 tablets before bedtime. Do all this for 14 days.    ASMANEX, 30 METERED DOSES, 220 MCG/INH INHALER    INHALE 1 PUFF into the lungs DAILY    BUSPIRONE (BUSPAR) 10 MG TABLET    Take 1 tablet by mouth twice a day with meals    CETIRIZINE-PSUEDOEPHEDRINE (ZYRTEC-D) 5-120 MG PER EXTENDED RELEASE TABLET    TAKE 1 TABLET BY MOUTH TWICE DAILY    HYDROXYZINE (VISTARIL) 25 MG CAPSULE    TAKE

## 2024-09-09 ENCOUNTER — APPOINTMENT (OUTPATIENT)
Dept: GENERAL RADIOLOGY | Age: 29
End: 2024-09-09
Payer: OTHER MISCELLANEOUS

## 2024-09-09 ENCOUNTER — HOSPITAL ENCOUNTER (EMERGENCY)
Age: 29
Discharge: HOME | End: 2024-09-09
Attending: EMERGENCY MEDICINE
Payer: OTHER MISCELLANEOUS

## 2024-09-09 ENCOUNTER — APPOINTMENT (OUTPATIENT)
Dept: CT IMAGING | Age: 29
End: 2024-09-09
Attending: EMERGENCY MEDICINE
Payer: OTHER MISCELLANEOUS

## 2024-09-09 VITALS
TEMPERATURE: 97.1 F | SYSTOLIC BLOOD PRESSURE: 121 MMHG | OXYGEN SATURATION: 97 % | BODY MASS INDEX: 31.1 KG/M2 | RESPIRATION RATE: 15 BRPM | DIASTOLIC BLOOD PRESSURE: 78 MMHG | WEIGHT: 210 LBS | HEIGHT: 69 IN | HEART RATE: 79 BPM

## 2024-09-09 DIAGNOSIS — S09.90XA CLOSED HEAD INJURY, INITIAL ENCOUNTER: Primary | ICD-10-CM

## 2024-09-09 PROCEDURE — 6370000000 HC RX 637 (ALT 250 FOR IP): Performed by: EMERGENCY MEDICINE

## 2024-09-09 PROCEDURE — 99284 EMERGENCY DEPT VISIT MOD MDM: CPT

## 2024-09-09 PROCEDURE — 96372 THER/PROPH/DIAG INJ SC/IM: CPT

## 2024-09-09 PROCEDURE — 90471 IMMUNIZATION ADMIN: CPT | Performed by: EMERGENCY MEDICINE

## 2024-09-09 PROCEDURE — 6360000002 HC RX W HCPCS: Performed by: EMERGENCY MEDICINE

## 2024-09-09 PROCEDURE — 70450 CT HEAD/BRAIN W/O DYE: CPT

## 2024-09-09 PROCEDURE — 90715 TDAP VACCINE 7 YRS/> IM: CPT | Performed by: EMERGENCY MEDICINE

## 2024-09-09 PROCEDURE — 73000 X-RAY EXAM OF COLLAR BONE: CPT

## 2024-09-09 PROCEDURE — 73560 X-RAY EXAM OF KNEE 1 OR 2: CPT

## 2024-09-09 PROCEDURE — 72125 CT NECK SPINE W/O DYE: CPT

## 2024-09-09 RX ORDER — PROCHLORPERAZINE EDISYLATE 5 MG/ML
10 INJECTION INTRAMUSCULAR; INTRAVENOUS ONCE
Status: COMPLETED | OUTPATIENT
Start: 2024-09-09 | End: 2024-09-09

## 2024-09-09 RX ORDER — TIZANIDINE HYDROCHLORIDE 4 MG/1
4 CAPSULE, GELATIN COATED ORAL 3 TIMES DAILY PRN
Qty: 15 CAPSULE | Refills: 0 | Status: SHIPPED | OUTPATIENT
Start: 2024-09-09

## 2024-09-09 RX ORDER — BUTALBITAL, ACETAMINOPHEN AND CAFFEINE 300; 40; 50 MG/1; MG/1; MG/1
1 CAPSULE ORAL ONCE
Status: COMPLETED | OUTPATIENT
Start: 2024-09-09 | End: 2024-09-09

## 2024-09-09 RX ORDER — DIPHENHYDRAMINE HYDROCHLORIDE 50 MG/ML
25 INJECTION INTRAMUSCULAR; INTRAVENOUS ONCE
Status: COMPLETED | OUTPATIENT
Start: 2024-09-09 | End: 2024-09-09

## 2024-09-09 RX ORDER — LIDOCAINE 50 MG/G
1 PATCH TOPICAL DAILY
Qty: 30 PATCH | Refills: 0 | Status: SHIPPED | OUTPATIENT
Start: 2024-09-09

## 2024-09-09 RX ORDER — SENNOSIDES 8.6 MG
650 CAPSULE ORAL EVERY 8 HOURS PRN
Qty: 30 TABLET | Refills: 0 | Status: SHIPPED | OUTPATIENT
Start: 2024-09-09

## 2024-09-09 RX ADMIN — TETANUS TOXOID, REDUCED DIPHTHERIA TOXOID AND ACELLULAR PERTUSSIS VACCINE, ADSORBED 0.5 ML: 5; 2.5; 8; 8; 2.5 SUSPENSION INTRAMUSCULAR at 13:41

## 2024-09-09 RX ADMIN — DIPHENHYDRAMINE HYDROCHLORIDE 25 MG: 50 INJECTION INTRAMUSCULAR; INTRAVENOUS at 13:42

## 2024-09-09 RX ADMIN — PROCHLORPERAZINE EDISYLATE 10 MG: 5 INJECTION INTRAMUSCULAR; INTRAVENOUS at 13:42

## 2024-09-09 RX ADMIN — BUTALBITA,ACETAMINOPHEN AND CAFFEINE 1 CAPSULE: 50; 300; 40 CAPSULE ORAL at 13:39

## 2024-09-09 ASSESSMENT — PAIN DESCRIPTION - DESCRIPTORS: DESCRIPTORS: DISCOMFORT

## 2024-09-09 ASSESSMENT — PAIN - FUNCTIONAL ASSESSMENT
PAIN_FUNCTIONAL_ASSESSMENT: 0-10
PAIN_FUNCTIONAL_ASSESSMENT: 0-10

## 2024-09-09 ASSESSMENT — LIFESTYLE VARIABLES
HOW OFTEN DO YOU HAVE A DRINK CONTAINING ALCOHOL: NEVER
HOW MANY STANDARD DRINKS CONTAINING ALCOHOL DO YOU HAVE ON A TYPICAL DAY: PATIENT DOES NOT DRINK

## 2024-09-09 ASSESSMENT — ENCOUNTER SYMPTOMS
ABDOMINAL PAIN: 0
SHORTNESS OF BREATH: 0
BACK PAIN: 0

## 2024-09-09 ASSESSMENT — PAIN DESCRIPTION - ORIENTATION: ORIENTATION: RIGHT

## 2024-09-09 ASSESSMENT — PAIN SCALES - GENERAL
PAINLEVEL_OUTOF10: 9
PAINLEVEL_OUTOF10: 9
PAINLEVEL_OUTOF10: 4

## 2024-09-09 ASSESSMENT — PAIN DESCRIPTION - LOCATION
LOCATION: ARM;NECK;SHOULDER
LOCATION: ARM

## 2024-09-09 ASSESSMENT — PAIN DESCRIPTION - PAIN TYPE: TYPE: ACUTE PAIN

## 2025-01-15 ENCOUNTER — APPOINTMENT (OUTPATIENT)
Dept: GENERAL RADIOLOGY | Age: 30
End: 2025-01-15

## 2025-01-15 ENCOUNTER — APPOINTMENT (OUTPATIENT)
Dept: CT IMAGING | Age: 30
End: 2025-01-15

## 2025-01-15 ENCOUNTER — HOSPITAL ENCOUNTER (EMERGENCY)
Age: 30
Discharge: HOME | End: 2025-01-15

## 2025-01-15 VITALS
SYSTOLIC BLOOD PRESSURE: 123 MMHG | BODY MASS INDEX: 35.61 KG/M2 | OXYGEN SATURATION: 97 % | WEIGHT: 235 LBS | TEMPERATURE: 97.8 F | HEIGHT: 68 IN | HEART RATE: 81 BPM | DIASTOLIC BLOOD PRESSURE: 81 MMHG | RESPIRATION RATE: 18 BRPM

## 2025-01-15 DIAGNOSIS — G89.29 CHRONIC MIDLINE BACK PAIN, UNSPECIFIED BACK LOCATION: Primary | ICD-10-CM

## 2025-01-15 DIAGNOSIS — M54.9 CHRONIC MIDLINE BACK PAIN, UNSPECIFIED BACK LOCATION: Primary | ICD-10-CM

## 2025-01-15 LAB
ALBUMIN SERPL-MCNC: 4.2 G/DL (ref 3.5–4.6)
ALP SERPL-CCNC: 87 U/L (ref 40–130)
ALT SERPL-CCNC: 26 U/L (ref 0–33)
ANION GAP SERPL CALCULATED.3IONS-SCNC: 11 MEQ/L (ref 9–15)
AST SERPL-CCNC: 20 U/L (ref 0–35)
B PARAP IS1001 DNA NPH QL NAA+NON-PROBE: NOT DETECTED
B PERT.PT PRMT NPH QL NAA+NON-PROBE: NOT DETECTED
BASOPHILS # BLD: 0 K/UL (ref 0–0.2)
BASOPHILS NFR BLD: 0.3 %
BILIRUB SERPL-MCNC: 0.3 MG/DL (ref 0.2–0.7)
BILIRUB UR QL STRIP: NEGATIVE
BUN SERPL-MCNC: 10 MG/DL (ref 6–20)
C PNEUM DNA NPH QL NAA+NON-PROBE: NOT DETECTED
CALCIUM SERPL-MCNC: 9.3 MG/DL (ref 8.5–9.9)
CHLORIDE SERPL-SCNC: 96 MEQ/L (ref 95–107)
CLARITY UR: CLEAR
CO2 SERPL-SCNC: 28 MEQ/L (ref 20–31)
COLOR UR: YELLOW
CREAT SERPL-MCNC: 0.85 MG/DL (ref 0.5–0.9)
EKG ATRIAL RATE: 70 BPM
EKG P AXIS: 61 DEGREES
EKG P-R INTERVAL: 158 MS
EKG Q-T INTERVAL: 386 MS
EKG QRS DURATION: 106 MS
EKG QTC CALCULATION (BAZETT): 416 MS
EKG R AXIS: 4 DEGREES
EKG T AXIS: 35 DEGREES
EKG VENTRICULAR RATE: 70 BPM
EOSINOPHIL # BLD: 0.2 K/UL (ref 0–0.7)
EOSINOPHIL NFR BLD: 2.4 %
ERYTHROCYTE [DISTWIDTH] IN BLOOD BY AUTOMATED COUNT: 14.2 % (ref 11.5–14.5)
FLUAV RNA NPH QL NAA+NON-PROBE: NOT DETECTED
FLUBV RNA NPH QL NAA+NON-PROBE: NOT DETECTED
GLOBULIN SER CALC-MCNC: 3.5 G/DL (ref 2.3–3.5)
GLUCOSE SERPL-MCNC: 111 MG/DL (ref 70–99)
GLUCOSE UR STRIP-MCNC: NEGATIVE MG/DL
HADV DNA NPH QL NAA+NON-PROBE: NOT DETECTED
HCG, URINE, POC: NEGATIVE
HCOV 229E RNA NPH QL NAA+NON-PROBE: NOT DETECTED
HCOV HKU1 RNA NPH QL NAA+NON-PROBE: NOT DETECTED
HCOV NL63 RNA NPH QL NAA+NON-PROBE: NOT DETECTED
HCOV OC43 RNA NPH QL NAA+NON-PROBE: NOT DETECTED
HCT VFR BLD AUTO: 42.6 % (ref 37–47)
HGB BLD-MCNC: 14.2 G/DL (ref 12–16)
HGB UR QL STRIP: NEGATIVE
HMPV RNA NPH QL NAA+NON-PROBE: NOT DETECTED
HPIV1 RNA NPH QL NAA+NON-PROBE: NOT DETECTED
HPIV2 RNA NPH QL NAA+NON-PROBE: NOT DETECTED
HPIV3 RNA NPH QL NAA+NON-PROBE: NOT DETECTED
HPIV4 RNA NPH QL NAA+NON-PROBE: NOT DETECTED
KETONES UR STRIP-MCNC: NEGATIVE MG/DL
LEUKOCYTE ESTERASE UR QL STRIP: NEGATIVE
LIPASE SERPL-CCNC: 22 U/L (ref 12–95)
LYMPHOCYTES # BLD: 2.9 K/UL (ref 1–4.8)
LYMPHOCYTES NFR BLD: 28 %
Lab: NORMAL
M PNEUMO DNA NPH QL NAA+NON-PROBE: NOT DETECTED
MCH RBC QN AUTO: 27.2 PG (ref 27–31.3)
MCHC RBC AUTO-ENTMCNC: 33.3 % (ref 33–37)
MCV RBC AUTO: 81.5 FL (ref 79.4–94.8)
MONOCYTES # BLD: 0.5 K/UL (ref 0.2–0.8)
MONOCYTES NFR BLD: 4.4 %
NEGATIVE QC PASS/FAIL: NORMAL
NEUTROPHILS # BLD: 6.6 K/UL (ref 1.4–6.5)
NEUTS SEG NFR BLD: 64.6 %
NITRITE UR QL STRIP: NEGATIVE
PERFORMED ON: NORMAL
PH UR STRIP: 6.5 [PH] (ref 5–9)
PLATELET # BLD AUTO: 327 K/UL (ref 130–400)
POC CREATININE WHOLE BLOOD: 1
POC CREATININE: 1 MG/DL (ref 0.6–1.2)
POC SAMPLE TYPE: NORMAL
POSITIVE QC PASS/FAIL: NORMAL
POTASSIUM SERPL-SCNC: 3.8 MEQ/L (ref 3.4–4.9)
PROT SERPL-MCNC: 7.7 G/DL (ref 6.3–8)
PROT UR STRIP-MCNC: NEGATIVE MG/DL
RBC # BLD AUTO: 5.23 M/UL (ref 4.2–5.4)
RSV RNA NPH QL NAA+NON-PROBE: NOT DETECTED
RV+EV RNA NPH QL NAA+NON-PROBE: NOT DETECTED
SARS-COV-2 RNA NPH QL NAA+NON-PROBE: NOT DETECTED
SODIUM SERPL-SCNC: 135 MEQ/L (ref 135–144)
SP GR UR STRIP: 1.02 (ref 1–1.03)
STREP GRP A PCR: NEGATIVE
TROPONIN, HIGH SENSITIVITY: <6 NG/L (ref 0–19)
URINE REFLEX TO CULTURE: NORMAL
UROBILINOGEN UR STRIP-ACNC: 0.2 E.U./DL
WBC # BLD AUTO: 10.2 K/UL (ref 4.8–10.8)

## 2025-01-15 PROCEDURE — 80053 COMPREHEN METABOLIC PANEL: CPT

## 2025-01-15 PROCEDURE — 6370000000 HC RX 637 (ALT 250 FOR IP): Performed by: PERSONAL EMERGENCY RESPONSE ATTENDANT

## 2025-01-15 PROCEDURE — 71275 CT ANGIOGRAPHY CHEST: CPT

## 2025-01-15 PROCEDURE — 96374 THER/PROPH/DIAG INJ IV PUSH: CPT

## 2025-01-15 PROCEDURE — 85025 COMPLETE CBC W/AUTO DIFF WBC: CPT

## 2025-01-15 PROCEDURE — 6360000002 HC RX W HCPCS: Performed by: PERSONAL EMERGENCY RESPONSE ATTENDANT

## 2025-01-15 PROCEDURE — 96375 TX/PRO/DX INJ NEW DRUG ADDON: CPT

## 2025-01-15 PROCEDURE — 99285 EMERGENCY DEPT VISIT HI MDM: CPT

## 2025-01-15 PROCEDURE — 71045 X-RAY EXAM CHEST 1 VIEW: CPT

## 2025-01-15 PROCEDURE — 93005 ELECTROCARDIOGRAM TRACING: CPT | Performed by: PERSONAL EMERGENCY RESPONSE ATTENDANT

## 2025-01-15 PROCEDURE — 6360000004 HC RX CONTRAST MEDICATION: Performed by: PERSONAL EMERGENCY RESPONSE ATTENDANT

## 2025-01-15 PROCEDURE — 81003 URINALYSIS AUTO W/O SCOPE: CPT

## 2025-01-15 PROCEDURE — 87651 STREP A DNA AMP PROBE: CPT

## 2025-01-15 PROCEDURE — 0202U NFCT DS 22 TRGT SARS-COV-2: CPT

## 2025-01-15 PROCEDURE — 83690 ASSAY OF LIPASE: CPT

## 2025-01-15 PROCEDURE — 84484 ASSAY OF TROPONIN QUANT: CPT

## 2025-01-15 RX ORDER — MORPHINE SULFATE 4 MG/ML
4 INJECTION, SOLUTION INTRAMUSCULAR; INTRAVENOUS ONCE
Status: COMPLETED | OUTPATIENT
Start: 2025-01-15 | End: 2025-01-15

## 2025-01-15 RX ORDER — ONDANSETRON 2 MG/ML
4 INJECTION INTRAMUSCULAR; INTRAVENOUS ONCE
Status: COMPLETED | OUTPATIENT
Start: 2025-01-15 | End: 2025-01-15

## 2025-01-15 RX ORDER — KETOROLAC TROMETHAMINE 30 MG/ML
30 INJECTION, SOLUTION INTRAMUSCULAR; INTRAVENOUS ONCE
Status: COMPLETED | OUTPATIENT
Start: 2025-01-15 | End: 2025-01-15

## 2025-01-15 RX ORDER — TRAMADOL HYDROCHLORIDE 50 MG/1
50 TABLET ORAL EVERY 4 HOURS PRN
Qty: 10 TABLET | Refills: 0 | Status: SHIPPED | OUTPATIENT
Start: 2025-01-15 | End: 2025-01-18

## 2025-01-15 RX ORDER — DICYCLOMINE HYDROCHLORIDE 10 MG/1
10 CAPSULE ORAL ONCE
Status: COMPLETED | OUTPATIENT
Start: 2025-01-15 | End: 2025-01-15

## 2025-01-15 RX ORDER — IOPAMIDOL 755 MG/ML
75 INJECTION, SOLUTION INTRAVASCULAR
Status: COMPLETED | OUTPATIENT
Start: 2025-01-15 | End: 2025-01-15

## 2025-01-15 RX ADMIN — ONDANSETRON 4 MG: 2 INJECTION, SOLUTION INTRAMUSCULAR; INTRAVENOUS at 03:21

## 2025-01-15 RX ADMIN — IOPAMIDOL 75 ML: 755 INJECTION, SOLUTION INTRAVENOUS at 03:48

## 2025-01-15 RX ADMIN — MORPHINE SULFATE 4 MG: 4 INJECTION, SOLUTION INTRAMUSCULAR; INTRAVENOUS at 03:21

## 2025-01-15 RX ADMIN — KETOROLAC TROMETHAMINE 30 MG: 30 INJECTION, SOLUTION INTRAMUSCULAR at 03:21

## 2025-01-15 RX ADMIN — DICYCLOMINE HYDROCHLORIDE 10 MG: 10 CAPSULE ORAL at 04:28

## 2025-01-15 ASSESSMENT — ENCOUNTER SYMPTOMS
RHINORRHEA: 0
SORE THROAT: 0
SHORTNESS OF BREATH: 1
BACK PAIN: 1
ABDOMINAL PAIN: 0
NAUSEA: 1
DIARRHEA: 1
COUGH: 0
COLOR CHANGE: 0
BLOOD IN STOOL: 0
WHEEZING: 1
VOMITING: 1

## 2025-01-15 ASSESSMENT — PAIN DESCRIPTION - LOCATION
LOCATION: BACK
LOCATION: BACK
LOCATION: ABDOMEN

## 2025-01-15 ASSESSMENT — PAIN SCALES - GENERAL
PAINLEVEL_OUTOF10: 7
PAINLEVEL_OUTOF10: 10
PAINLEVEL_OUTOF10: 5

## 2025-01-15 ASSESSMENT — PAIN - FUNCTIONAL ASSESSMENT: PAIN_FUNCTIONAL_ASSESSMENT: 0-10

## 2025-01-15 ASSESSMENT — PAIN DESCRIPTION - DESCRIPTORS
DESCRIPTORS: ACHING
DESCRIPTORS: DISCOMFORT

## 2025-01-15 NOTE — ED TRIAGE NOTES
Pt to Er with c/o back pain, pt has mild thoracic spondylosis and states they told her they can't do anything about it, pain in unbearable, took muscle relaxer without relief

## 2025-01-15 NOTE — ED NOTES
IV removed  Ambulatory w/o assistance, steady  D/c instructions and script given  No further questions

## 2025-01-15 NOTE — ED PROVIDER NOTES
cervical and bilateral paraspinal tenderness.  Mild midline T and L spinous process tenderness and bilateral paraspinal tenderness, no step-offs or signs of trauma  Cardiovascular:      Heart sounds: Normal heart sounds.   Pulmonary:      Effort: Pulmonary effort is normal. No respiratory distress.      Breath sounds: Normal breath sounds. No stridor.      Comments: Mild midsternal chest tenderness to palpation  Chest:      Chest wall: Tenderness present.   Abdominal:      General: Bowel sounds are normal. There is no distension.      Palpations: Abdomen is soft. There is no mass.      Tenderness: There is abdominal tenderness. There is no guarding or rebound.      Comments: Mild upper abdominal tenderness to palpation, abdomen soft and nondistended, no rebound or rigidity, no pulsatile mass or bruit, no ecchymosis   Musculoskeletal:         General: Normal range of motion.      Cervical back: Normal range of motion and neck supple. Tenderness present.   Skin:     General: Skin is warm and dry.      Capillary Refill: Capillary refill takes less than 2 seconds.      Findings: No rash.   Neurological:      Mental Status: She is alert and oriented to person, place, and time.      Deep Tendon Reflexes: Reflexes are normal and symmetric.   Psychiatric:         Behavior: Behavior normal.         Thought Content: Thought content normal.         Judgment: Judgment normal.         DIAGNOSTIC RESULTS     EKG:All EKG's are interpreted by the Emergency Department Physician who either signs or Co-signs this chart in the absence of a cardiologist.    Personal interpretation:    Normal sinus rhythm with sinus arrhythmia, rate 70, no ST segment changes    RADIOLOGY:   Non-plain film images such as CT, Ultrasound and MRI are read by theradiologist. Plain radiographic images are visualized and preliminarily interpreted by the emergency physician with the below findings:    Interpretation per theRadiologist below, if available at the

## 2025-08-06 ENCOUNTER — HOSPITAL ENCOUNTER (EMERGENCY)
Age: 30
Discharge: HOME | End: 2025-08-06
Attending: EMERGENCY MEDICINE
Payer: MEDICAID

## 2025-08-06 VITALS
HEIGHT: 69 IN | SYSTOLIC BLOOD PRESSURE: 139 MMHG | DIASTOLIC BLOOD PRESSURE: 95 MMHG | HEART RATE: 93 BPM | TEMPERATURE: 98.4 F | BODY MASS INDEX: 30.36 KG/M2 | WEIGHT: 205 LBS | RESPIRATION RATE: 17 BRPM | OXYGEN SATURATION: 96 %

## 2025-08-06 DIAGNOSIS — G89.29 ACUTE EXACERBATION OF CHRONIC LOW BACK PAIN: Primary | ICD-10-CM

## 2025-08-06 DIAGNOSIS — M62.838 SPASM OF MUSCLE: ICD-10-CM

## 2025-08-06 DIAGNOSIS — M54.32 BILATERAL SCIATICA: ICD-10-CM

## 2025-08-06 DIAGNOSIS — M54.31 BILATERAL SCIATICA: ICD-10-CM

## 2025-08-06 DIAGNOSIS — M54.50 ACUTE EXACERBATION OF CHRONIC LOW BACK PAIN: Primary | ICD-10-CM

## 2025-08-06 PROCEDURE — 6360000002 HC RX W HCPCS: Performed by: EMERGENCY MEDICINE

## 2025-08-06 PROCEDURE — 96372 THER/PROPH/DIAG INJ SC/IM: CPT

## 2025-08-06 PROCEDURE — 99284 EMERGENCY DEPT VISIT MOD MDM: CPT

## 2025-08-06 PROCEDURE — 6370000000 HC RX 637 (ALT 250 FOR IP): Performed by: EMERGENCY MEDICINE

## 2025-08-06 RX ORDER — GABAPENTIN 300 MG/1
300 CAPSULE ORAL 3 TIMES DAILY
COMMUNITY
Start: 2025-06-12 | End: 2025-09-10

## 2025-08-06 RX ORDER — CYCLOBENZAPRINE HCL 10 MG
10 TABLET ORAL 3 TIMES DAILY PRN
Qty: 30 TABLET | Refills: 0 | Status: SHIPPED | OUTPATIENT
Start: 2025-08-06 | End: 2025-08-16

## 2025-08-06 RX ORDER — METHYLPREDNISOLONE ACETATE 40 MG/ML
40 INJECTION, SUSPENSION INTRA-ARTICULAR; INTRALESIONAL; INTRAMUSCULAR; SOFT TISSUE ONCE
Status: COMPLETED | OUTPATIENT
Start: 2025-08-06 | End: 2025-08-06

## 2025-08-06 RX ORDER — GABAPENTIN 300 MG/1
300 CAPSULE ORAL 3 TIMES DAILY
Status: DISCONTINUED | OUTPATIENT
Start: 2025-08-06 | End: 2025-08-06 | Stop reason: HOSPADM

## 2025-08-06 RX ORDER — PREDNISONE 20 MG/1
40 TABLET ORAL DAILY
Qty: 10 TABLET | Refills: 0 | Status: SHIPPED | OUTPATIENT
Start: 2025-08-06 | End: 2025-08-11

## 2025-08-06 RX ADMIN — METHYLPREDNISOLONE ACETATE 40 MG: 40 INJECTION, SUSPENSION INTRA-ARTICULAR; INTRALESIONAL; INTRAMUSCULAR; SOFT TISSUE at 12:33

## 2025-08-06 RX ADMIN — GABAPENTIN 300 MG: 300 CAPSULE ORAL at 12:46

## 2025-08-06 ASSESSMENT — ENCOUNTER SYMPTOMS
EYE PAIN: 0
CHEST TIGHTNESS: 0
FACIAL SWELLING: 0
WHEEZING: 0
COUGH: 0
VOICE CHANGE: 0
STRIDOR: 0
TROUBLE SWALLOWING: 0
DIARRHEA: 0
VOMITING: 0
BACK PAIN: 1
SORE THROAT: 0
CONSTIPATION: 0
SHORTNESS OF BREATH: 0
CHOKING: 0
EYE REDNESS: 0
SINUS PRESSURE: 0
ABDOMINAL PAIN: 0
EYE DISCHARGE: 0
BLOOD IN STOOL: 0

## 2025-08-06 ASSESSMENT — PAIN DESCRIPTION - LOCATION: LOCATION: BACK

## 2025-08-06 ASSESSMENT — PAIN DESCRIPTION - ORIENTATION: ORIENTATION: LEFT

## 2025-08-06 ASSESSMENT — PAIN SCALES - GENERAL: PAINLEVEL_OUTOF10: 10

## (undated) DEVICE — TUBING, SUCTION, 9/32" X 12', STRAIGHT: Brand: MEDLINE INDUSTRIES, INC.

## (undated) DEVICE — GOWN,AURORA,NONREINFORCED,LARGE: Brand: MEDLINE

## (undated) DEVICE — GOWN,AURORA,NONRNF,XL,30/CS: Brand: MEDLINE

## (undated) DEVICE — TROCARS: Brand: KII® BALLOON BLUNT TIP SYSTEM

## (undated) DEVICE — SUTURE SZ 0 27IN 5/8 CIR UR-6  TAPER PT VIOLET ABSRB VICRYL J603H

## (undated) DEVICE — LABEL MED MINI W/ MARKER

## (undated) DEVICE — ELECTRODE LAP L36CM PTFE WIRE J HK CLEANCOAT

## (undated) DEVICE — TROCAR: Brand: KII SHIELDED BLADED ACCESS SYSTEM

## (undated) DEVICE — COVER LT HNDL BLU PLAS

## (undated) DEVICE — SUTURE MCRYL SZ 4-0 L27IN ABSRB UD L19MM PS-2 1/2 CIR PRIM Y426H

## (undated) DEVICE — SINGLE PORT MANIFOLD: Brand: NEPTUNE 2

## (undated) DEVICE — TISSUE RETRIEVAL SYSTEM: Brand: INZII RETRIEVAL SYSTEM

## (undated) DEVICE — Device

## (undated) DEVICE — ELECTRODE PT RET AD L9FT HI MOIST COND ADH HYDRGEL CORDED

## (undated) DEVICE — APPLICATOR MEDICATED 26 CC SOLUTION HI LT ORNG CHLORAPREP

## (undated) DEVICE — TUBING INSUFFLATION SMK EVAC HI FLO SET PNEUMOCLEAR

## (undated) DEVICE — APPLIER CLP M/L SHFT DIA5MM 15 LIG LIGAMAX 5

## (undated) DEVICE — GAUZE,SPONGE,4"X4",16PLY,XRAY,STRL,LF: Brand: MEDLINE

## (undated) DEVICE — COUNTER NDL 40 COUNT HLD 70 FOAM BLK ADH W/ MAG

## (undated) DEVICE — ADHESIVE SKIN CLSR 0.7ML TOP DERMBND ADV

## (undated) DEVICE — PACK,BASIC: Brand: MEDLINE

## (undated) DEVICE — GLOVE ORANGE PI 7   MSG9070

## (undated) DEVICE — SYRINGE MED 10ML LUERLOCK TIP W/O SFTY DISP

## (undated) DEVICE — TOWEL,OR,DSP,ST,BLUE,STD,4/PK,20PK/CS: Brand: MEDLINE

## (undated) DEVICE — NEEDLE INSUF L120MM ULT VERES ENDOPATH

## (undated) DEVICE — NEPTUNE E-SEP SMOKE EVACUATION PENCIL, COATED, 70MM BLADE, PUSH BUTTON SWITCH: Brand: NEPTUNE E-SEP

## (undated) DEVICE — KIT,ANTI FOG,W/SPONGE & FLUID,SOFT PACK: Brand: MEDLINE

## (undated) DEVICE — TROCAR: Brand: KII® SLEEVE

## (undated) DEVICE — DRAPE,LAP,CHOLE,W/TROUGHS,STERILE: Brand: MEDLINE

## (undated) DEVICE — GLOVE ORANGE PI 7 1/2   MSG9075

## (undated) DEVICE — INTENDED FOR TISSUE SEPARATION, AND OTHER PROCEDURES THAT REQUIRE A SHARP SURGICAL BLADE TO PUNCTURE OR CUT.: Brand: BARD-PARKER ® CARBON RIB-BACK BLADES

## (undated) DEVICE — WARMER SCP 2 ANTIFOG LAP DISP

## (undated) DEVICE — TROCAR: Brand: KII FIOS FIRST ENTRY